# Patient Record
Sex: FEMALE | Race: WHITE | Employment: OTHER | URBAN - METROPOLITAN AREA
[De-identification: names, ages, dates, MRNs, and addresses within clinical notes are randomized per-mention and may not be internally consistent; named-entity substitution may affect disease eponyms.]

---

## 2017-12-07 ENCOUNTER — HOSPITAL ENCOUNTER (OUTPATIENT)
Dept: GENERAL RADIOLOGY | Age: 72
Discharge: HOME | End: 2017-12-07
Payer: MEDICARE

## 2017-12-07 DIAGNOSIS — J44.9 CHRONIC OBSTRUCTIVE PULMONARY DISEASE (HCC): ICD-10-CM

## 2017-12-07 PROCEDURE — 71020 XR CHEST PA LAT: CPT

## 2018-06-11 RX ORDER — BUDESONIDE AND FORMOTEROL FUMARATE DIHYDRATE 80; 4.5 UG/1; UG/1
2 AEROSOL RESPIRATORY (INHALATION) 2 TIMES DAILY
Refills: 3 | COMMUNITY
Start: 2018-04-30

## 2018-06-11 RX ORDER — LOSARTAN POTASSIUM 50 MG/1
50 TABLET ORAL DAILY
COMMUNITY

## 2018-06-11 RX ORDER — DIAZEPAM 5 MG/1
5 TABLET ORAL DAILY
COMMUNITY

## 2018-06-14 ENCOUNTER — ANESTHESIA EVENT (OUTPATIENT)
Dept: SURGERY | Age: 73
DRG: 492 | End: 2018-06-14
Payer: MEDICARE

## 2018-06-15 ENCOUNTER — ANESTHESIA (OUTPATIENT)
Dept: SURGERY | Age: 73
DRG: 492 | End: 2018-06-15
Payer: MEDICARE

## 2018-06-15 ENCOUNTER — APPOINTMENT (OUTPATIENT)
Dept: GENERAL RADIOLOGY | Age: 73
DRG: 492 | End: 2018-06-15
Attending: ORTHOPAEDIC SURGERY
Payer: MEDICARE

## 2018-06-15 ENCOUNTER — HOSPITAL ENCOUNTER (INPATIENT)
Age: 73
LOS: 3 days | Discharge: NURSING, REHAB OR LONG-TERM CARE | DRG: 492 | End: 2018-06-19
Attending: ORTHOPAEDIC SURGERY | Admitting: ORTHOPAEDIC SURGERY
Payer: MEDICARE

## 2018-06-15 DIAGNOSIS — S82.853B: Primary | ICD-10-CM

## 2018-06-15 PROBLEM — M19.079 ANKLE ARTHRITIS: Status: ACTIVE | Noted: 2018-06-15

## 2018-06-15 PROBLEM — G89.29 CHRONIC PAIN OF RIGHT ANKLE: Status: ACTIVE | Noted: 2018-06-15

## 2018-06-15 PROBLEM — M25.571 CHRONIC PAIN OF RIGHT ANKLE: Status: ACTIVE | Noted: 2018-06-15

## 2018-06-15 LAB
ANION GAP SERPL CALC-SCNC: 9 MMOL/L (ref 3–18)
BUN SERPL-MCNC: 19 MG/DL (ref 7–18)
BUN/CREAT SERPL: 17 (ref 12–20)
CALCIUM SERPL-MCNC: 8.6 MG/DL (ref 8.5–10.1)
CHLORIDE SERPL-SCNC: 101 MMOL/L (ref 100–108)
CO2 SERPL-SCNC: 28 MMOL/L (ref 21–32)
CREAT SERPL-MCNC: 1.11 MG/DL (ref 0.6–1.3)
GLUCOSE SERPL-MCNC: 113 MG/DL (ref 74–99)
POTASSIUM SERPL-SCNC: 3.6 MMOL/L (ref 3.5–5.5)
SODIUM SERPL-SCNC: 138 MMOL/L (ref 136–145)

## 2018-06-15 PROCEDURE — 94664 DEMO&/EVAL PT USE INHALER: CPT

## 2018-06-15 PROCEDURE — 76010000131 HC OR TIME 2 TO 2.5 HR: Performed by: ORTHOPAEDIC SURGERY

## 2018-06-15 PROCEDURE — 77030014685 HC STIM BN GRWTH PHYSIO EXTERNAL ORTH -I1: Performed by: ORTHOPAEDIC SURGERY

## 2018-06-15 PROCEDURE — 74011258636 HC RX REV CODE- 258/636: Performed by: ORTHOPAEDIC SURGERY

## 2018-06-15 PROCEDURE — 64450 NJX AA&/STRD OTHER PN/BRANCH: CPT | Performed by: ANESTHESIOLOGY

## 2018-06-15 PROCEDURE — 77030002916 HC SUT ETHLN J&J -A: Performed by: ORTHOPAEDIC SURGERY

## 2018-06-15 PROCEDURE — 74011250636 HC RX REV CODE- 250/636: Performed by: ORTHOPAEDIC SURGERY

## 2018-06-15 PROCEDURE — 76942 ECHO GUIDE FOR BIOPSY: CPT | Performed by: ANESTHESIOLOGY

## 2018-06-15 PROCEDURE — 74011250636 HC RX REV CODE- 250/636

## 2018-06-15 PROCEDURE — 73610 X-RAY EXAM OF ANKLE: CPT

## 2018-06-15 PROCEDURE — C1769 GUIDE WIRE: HCPCS | Performed by: ORTHOPAEDIC SURGERY

## 2018-06-15 PROCEDURE — 76210000016 HC OR PH I REC 1 TO 1.5 HR: Performed by: ORTHOPAEDIC SURGERY

## 2018-06-15 PROCEDURE — 94640 AIRWAY INHALATION TREATMENT: CPT

## 2018-06-15 PROCEDURE — 74011250636 HC RX REV CODE- 250/636: Performed by: NURSE ANESTHETIST, CERTIFIED REGISTERED

## 2018-06-15 PROCEDURE — C1713 ANCHOR/SCREW BN/BN,TIS/BN: HCPCS | Performed by: ORTHOPAEDIC SURGERY

## 2018-06-15 PROCEDURE — 74011250637 HC RX REV CODE- 250/637

## 2018-06-15 PROCEDURE — 74011250637 HC RX REV CODE- 250/637: Performed by: ORTHOPAEDIC SURGERY

## 2018-06-15 PROCEDURE — 99218 HC RM OBSERVATION: CPT

## 2018-06-15 PROCEDURE — 77030027694: Performed by: ORTHOPAEDIC SURGERY

## 2018-06-15 PROCEDURE — 77030020753 HC CUF TRNQT 1BLA STRY -B: Performed by: ORTHOPAEDIC SURGERY

## 2018-06-15 PROCEDURE — 77030012904 HC TAPE CST BSNM -A: Performed by: ORTHOPAEDIC SURGERY

## 2018-06-15 PROCEDURE — 77030013079 HC BLNKT BAIR HGGR 3M -A: Performed by: NURSE ANESTHETIST, CERTIFIED REGISTERED

## 2018-06-15 PROCEDURE — 77030027692: Performed by: ORTHOPAEDIC SURGERY

## 2018-06-15 PROCEDURE — 74011000250 HC RX REV CODE- 250: Performed by: HOSPITALIST

## 2018-06-15 PROCEDURE — 77030011640 HC PAD GRND REM COVD -A: Performed by: ORTHOPAEDIC SURGERY

## 2018-06-15 PROCEDURE — 77030020274 HC MISC IMPL ORTHOPEDIC: Performed by: ORTHOPAEDIC SURGERY

## 2018-06-15 PROCEDURE — 36415 COLL VENOUS BLD VENIPUNCTURE: CPT | Performed by: ORTHOPAEDIC SURGERY

## 2018-06-15 PROCEDURE — 80048 BASIC METABOLIC PNL TOTAL CA: CPT | Performed by: ORTHOPAEDIC SURGERY

## 2018-06-15 PROCEDURE — 77030018836 HC SOL IRR NACL ICUM -A: Performed by: ORTHOPAEDIC SURGERY

## 2018-06-15 PROCEDURE — 77030020335 HC PDNG CST COVD -A: Performed by: ORTHOPAEDIC SURGERY

## 2018-06-15 PROCEDURE — 74011000250 HC RX REV CODE- 250

## 2018-06-15 PROCEDURE — 76060000035 HC ANESTHESIA 2 TO 2.5 HR: Performed by: ORTHOPAEDIC SURGERY

## 2018-06-15 PROCEDURE — 77030032490 HC SLV COMPR SCD KNE COVD -B: Performed by: ORTHOPAEDIC SURGERY

## 2018-06-15 PROCEDURE — 77030012510 HC MSK AIRWY LMA TELE -B: Performed by: NURSE ANESTHETIST, CERTIFIED REGISTERED

## 2018-06-15 PROCEDURE — 77010033678 HC OXYGEN DAILY

## 2018-06-15 PROCEDURE — 74011250637 HC RX REV CODE- 250/637: Performed by: HOSPITALIST

## 2018-06-15 DEVICE — IMPLANTABLE DEVICE: Type: IMPLANTABLE DEVICE | Site: TIBIA | Status: FUNCTIONAL

## 2018-06-15 DEVICE — GRAFT BNE SUB L CANC FRZN MORSELIZED W/ VIABLE CELL TRINITY: Type: IMPLANTABLE DEVICE | Site: TIBIA | Status: FUNCTIONAL

## 2018-06-15 DEVICE — CAP END 0MM CANN LOK FOR CENTRONAIL ANK COMPR NAILING SYS: Type: IMPLANTABLE DEVICE | Site: TIBIA | Status: FUNCTIONAL

## 2018-06-15 RX ORDER — SODIUM CHLORIDE, SODIUM LACTATE, POTASSIUM CHLORIDE, CALCIUM CHLORIDE 600; 310; 30; 20 MG/100ML; MG/100ML; MG/100ML; MG/100ML
75 INJECTION, SOLUTION INTRAVENOUS CONTINUOUS
Status: DISCONTINUED | OUTPATIENT
Start: 2018-06-15 | End: 2018-06-15 | Stop reason: HOSPADM

## 2018-06-15 RX ORDER — PROPOFOL 10 MG/ML
INJECTION, EMULSION INTRAVENOUS AS NEEDED
Status: DISCONTINUED | OUTPATIENT
Start: 2018-06-15 | End: 2018-06-15 | Stop reason: HOSPADM

## 2018-06-15 RX ORDER — INSULIN LISPRO 100 [IU]/ML
INJECTION, SOLUTION INTRAVENOUS; SUBCUTANEOUS ONCE
Status: DISCONTINUED | OUTPATIENT
Start: 2018-06-15 | End: 2018-06-15 | Stop reason: HOSPADM

## 2018-06-15 RX ORDER — DEXTROSE, SODIUM CHLORIDE, SODIUM LACTATE, POTASSIUM CHLORIDE, AND CALCIUM CHLORIDE 5; .6; .31; .03; .02 G/100ML; G/100ML; G/100ML; G/100ML; G/100ML
INJECTION, SOLUTION INTRAVENOUS AS NEEDED
Status: DISCONTINUED | OUTPATIENT
Start: 2018-06-15 | End: 2018-06-15 | Stop reason: HOSPADM

## 2018-06-15 RX ORDER — ROSUVASTATIN CALCIUM 10 MG/1
10 TABLET, COATED ORAL
Status: DISCONTINUED | OUTPATIENT
Start: 2018-06-15 | End: 2018-06-19 | Stop reason: HOSPADM

## 2018-06-15 RX ORDER — FENTANYL CITRATE 50 UG/ML
INJECTION, SOLUTION INTRAMUSCULAR; INTRAVENOUS
Status: COMPLETED
Start: 2018-06-15 | End: 2018-06-15

## 2018-06-15 RX ORDER — ONDANSETRON 2 MG/ML
4 INJECTION INTRAMUSCULAR; INTRAVENOUS
Status: DISCONTINUED | OUTPATIENT
Start: 2018-06-15 | End: 2018-06-19 | Stop reason: HOSPADM

## 2018-06-15 RX ORDER — TRAMADOL HYDROCHLORIDE 50 MG/1
50 TABLET ORAL EVERY 6 HOURS
Status: DISCONTINUED | OUTPATIENT
Start: 2018-06-15 | End: 2018-06-19 | Stop reason: HOSPADM

## 2018-06-15 RX ORDER — MAGNESIUM SULFATE 100 %
4 CRYSTALS MISCELLANEOUS AS NEEDED
Status: DISCONTINUED | OUTPATIENT
Start: 2018-06-15 | End: 2018-06-15 | Stop reason: HOSPADM

## 2018-06-15 RX ORDER — NALOXONE HYDROCHLORIDE 0.4 MG/ML
0.1 INJECTION, SOLUTION INTRAMUSCULAR; INTRAVENOUS; SUBCUTANEOUS AS NEEDED
Status: DISCONTINUED | OUTPATIENT
Start: 2018-06-15 | End: 2018-06-15 | Stop reason: HOSPADM

## 2018-06-15 RX ORDER — HYDROMORPHONE HYDROCHLORIDE 1 MG/ML
INJECTION, SOLUTION INTRAMUSCULAR; INTRAVENOUS; SUBCUTANEOUS
Status: COMPLETED
Start: 2018-06-15 | End: 2018-06-15

## 2018-06-15 RX ORDER — ONDANSETRON 2 MG/ML
INJECTION INTRAMUSCULAR; INTRAVENOUS AS NEEDED
Status: DISCONTINUED | OUTPATIENT
Start: 2018-06-15 | End: 2018-06-15 | Stop reason: HOSPADM

## 2018-06-15 RX ORDER — BUDESONIDE AND FORMOTEROL FUMARATE DIHYDRATE 80; 4.5 UG/1; UG/1
2 AEROSOL RESPIRATORY (INHALATION) 2 TIMES DAILY
Status: DISCONTINUED | OUTPATIENT
Start: 2018-06-15 | End: 2018-06-15 | Stop reason: CLARIF

## 2018-06-15 RX ORDER — HYDROMORPHONE HYDROCHLORIDE 2 MG/ML
0.5 INJECTION, SOLUTION INTRAMUSCULAR; INTRAVENOUS; SUBCUTANEOUS
Status: DISCONTINUED | OUTPATIENT
Start: 2018-06-15 | End: 2018-06-15 | Stop reason: HOSPADM

## 2018-06-15 RX ORDER — HYDROMORPHONE HYDROCHLORIDE 1 MG/ML
0.5 INJECTION, SOLUTION INTRAMUSCULAR; INTRAVENOUS; SUBCUTANEOUS
Status: DISCONTINUED | OUTPATIENT
Start: 2018-06-15 | End: 2018-06-17

## 2018-06-15 RX ORDER — ARFORMOTEROL TARTRATE 15 UG/2ML
15 SOLUTION RESPIRATORY (INHALATION)
Status: DISCONTINUED | OUTPATIENT
Start: 2018-06-15 | End: 2018-06-19 | Stop reason: HOSPADM

## 2018-06-15 RX ORDER — FENTANYL CITRATE 50 UG/ML
INJECTION, SOLUTION INTRAMUSCULAR; INTRAVENOUS AS NEEDED
Status: DISCONTINUED | OUTPATIENT
Start: 2018-06-15 | End: 2018-06-15 | Stop reason: HOSPADM

## 2018-06-15 RX ORDER — SODIUM CHLORIDE 0.9 % (FLUSH) 0.9 %
5-10 SYRINGE (ML) INJECTION EVERY 8 HOURS
Status: DISCONTINUED | OUTPATIENT
Start: 2018-06-15 | End: 2018-06-19 | Stop reason: HOSPADM

## 2018-06-15 RX ORDER — MIDAZOLAM HYDROCHLORIDE 1 MG/ML
INJECTION, SOLUTION INTRAMUSCULAR; INTRAVENOUS
Status: COMPLETED
Start: 2018-06-15 | End: 2018-06-15

## 2018-06-15 RX ORDER — HYDROMORPHONE HYDROCHLORIDE 2 MG/1
2-4 TABLET ORAL
Status: DISCONTINUED | OUTPATIENT
Start: 2018-06-15 | End: 2018-06-17

## 2018-06-15 RX ORDER — MIDAZOLAM HYDROCHLORIDE 1 MG/ML
2 INJECTION, SOLUTION INTRAMUSCULAR; INTRAVENOUS ONCE
Status: COMPLETED | OUTPATIENT
Start: 2018-06-15 | End: 2018-06-15

## 2018-06-15 RX ORDER — SODIUM CHLORIDE 0.9 % (FLUSH) 0.9 %
5-10 SYRINGE (ML) INJECTION EVERY 8 HOURS
Status: DISCONTINUED | OUTPATIENT
Start: 2018-06-15 | End: 2018-06-15 | Stop reason: HOSPADM

## 2018-06-15 RX ORDER — CEFAZOLIN SODIUM 2 G/50ML
2 SOLUTION INTRAVENOUS
Status: COMPLETED | OUTPATIENT
Start: 2018-06-15 | End: 2018-06-15

## 2018-06-15 RX ORDER — SODIUM CHLORIDE 0.9 % (FLUSH) 0.9 %
5-10 SYRINGE (ML) INJECTION AS NEEDED
Status: DISCONTINUED | OUTPATIENT
Start: 2018-06-15 | End: 2018-06-19 | Stop reason: HOSPADM

## 2018-06-15 RX ORDER — ENOXAPARIN SODIUM 100 MG/ML
40 INJECTION SUBCUTANEOUS EVERY 24 HOURS
Status: DISCONTINUED | OUTPATIENT
Start: 2018-06-16 | End: 2018-06-19 | Stop reason: HOSPADM

## 2018-06-15 RX ORDER — FAMOTIDINE 20 MG/1
TABLET, FILM COATED ORAL
Status: COMPLETED
Start: 2018-06-15 | End: 2018-06-15

## 2018-06-15 RX ORDER — ONDANSETRON 2 MG/ML
4 INJECTION INTRAMUSCULAR; INTRAVENOUS ONCE
Status: DISCONTINUED | OUTPATIENT
Start: 2018-06-15 | End: 2018-06-15 | Stop reason: HOSPADM

## 2018-06-15 RX ORDER — FLUMAZENIL 0.1 MG/ML
0.2 INJECTION INTRAVENOUS
Status: DISCONTINUED | OUTPATIENT
Start: 2018-06-15 | End: 2018-06-15 | Stop reason: HOSPADM

## 2018-06-15 RX ORDER — SODIUM CHLORIDE, SODIUM LACTATE, POTASSIUM CHLORIDE, CALCIUM CHLORIDE 600; 310; 30; 20 MG/100ML; MG/100ML; MG/100ML; MG/100ML
50 INJECTION, SOLUTION INTRAVENOUS CONTINUOUS
Status: DISCONTINUED | OUTPATIENT
Start: 2018-06-16 | End: 2018-06-15 | Stop reason: HOSPADM

## 2018-06-15 RX ORDER — SODIUM CHLORIDE 0.9 % (FLUSH) 0.9 %
5-10 SYRINGE (ML) INJECTION AS NEEDED
Status: DISCONTINUED | OUTPATIENT
Start: 2018-06-15 | End: 2018-06-15 | Stop reason: HOSPADM

## 2018-06-15 RX ORDER — DEXTROSE MONOHYDRATE 25 G/50ML
25-50 INJECTION, SOLUTION INTRAVENOUS AS NEEDED
Status: DISCONTINUED | OUTPATIENT
Start: 2018-06-15 | End: 2018-06-15 | Stop reason: HOSPADM

## 2018-06-15 RX ORDER — FAMOTIDINE 20 MG/1
20 TABLET, FILM COATED ORAL ONCE
Status: COMPLETED | OUTPATIENT
Start: 2018-06-16 | End: 2018-06-15

## 2018-06-15 RX ORDER — LIDOCAINE HYDROCHLORIDE 20 MG/ML
INJECTION, SOLUTION EPIDURAL; INFILTRATION; INTRACAUDAL; PERINEURAL AS NEEDED
Status: DISCONTINUED | OUTPATIENT
Start: 2018-06-15 | End: 2018-06-15 | Stop reason: HOSPADM

## 2018-06-15 RX ORDER — ALBUTEROL SULFATE 0.83 MG/ML
2.5 SOLUTION RESPIRATORY (INHALATION)
Status: DISCONTINUED | OUTPATIENT
Start: 2018-06-15 | End: 2018-06-19 | Stop reason: HOSPADM

## 2018-06-15 RX ORDER — FENTANYL CITRATE 50 UG/ML
100 INJECTION, SOLUTION INTRAMUSCULAR; INTRAVENOUS ONCE
Status: COMPLETED | OUTPATIENT
Start: 2018-06-15 | End: 2018-06-15

## 2018-06-15 RX ORDER — BUDESONIDE 0.5 MG/2ML
500 INHALANT ORAL
Status: DISCONTINUED | OUTPATIENT
Start: 2018-06-15 | End: 2018-06-19 | Stop reason: HOSPADM

## 2018-06-15 RX ADMIN — FENTANYL CITRATE 50 MCG: 50 INJECTION, SOLUTION INTRAMUSCULAR; INTRAVENOUS at 13:37

## 2018-06-15 RX ADMIN — FAMOTIDINE 20 MG: 20 TABLET ORAL at 11:06

## 2018-06-15 RX ADMIN — CEFAZOLIN SODIUM 2 G: 2 SOLUTION INTRAVENOUS at 13:54

## 2018-06-15 RX ADMIN — ARFORMOTEROL TARTRATE 15 MCG: 15 SOLUTION RESPIRATORY (INHALATION) at 21:24

## 2018-06-15 RX ADMIN — ONDANSETRON 4 MG: 2 INJECTION INTRAMUSCULAR; INTRAVENOUS at 14:07

## 2018-06-15 RX ADMIN — LIDOCAINE HYDROCHLORIDE 40 MG: 20 INJECTION, SOLUTION EPIDURAL; INFILTRATION; INTRACAUDAL; PERINEURAL at 14:01

## 2018-06-15 RX ADMIN — MIDAZOLAM HYDROCHLORIDE 1 MG: 1 INJECTION, SOLUTION INTRAMUSCULAR; INTRAVENOUS at 13:36

## 2018-06-15 RX ADMIN — FAMOTIDINE 20 MG: 20 TABLET, FILM COATED ORAL at 11:06

## 2018-06-15 RX ADMIN — HYDROMORPHONE HYDROCHLORIDE 0.5 MG: 1 INJECTION, SOLUTION INTRAMUSCULAR; INTRAVENOUS; SUBCUTANEOUS at 16:35

## 2018-06-15 RX ADMIN — Medication 10 ML: at 21:28

## 2018-06-15 RX ADMIN — HYDROMORPHONE HYDROCHLORIDE 2 MG: 2 TABLET ORAL at 22:32

## 2018-06-15 RX ADMIN — SODIUM CHLORIDE, SODIUM LACTATE, POTASSIUM CHLORIDE, AND CALCIUM CHLORIDE 50 ML/HR: 600; 310; 30; 20 INJECTION, SOLUTION INTRAVENOUS at 11:05

## 2018-06-15 RX ADMIN — ROSUVASTATIN CALCIUM 10 MG: 10 TABLET, FILM COATED ORAL at 21:28

## 2018-06-15 RX ADMIN — FENTANYL CITRATE 25 MCG: 50 INJECTION, SOLUTION INTRAMUSCULAR; INTRAVENOUS at 15:06

## 2018-06-15 RX ADMIN — TRAMADOL HYDROCHLORIDE 50 MG: 50 TABLET, FILM COATED ORAL at 20:16

## 2018-06-15 RX ADMIN — FENTANYL CITRATE 25 MCG: 50 INJECTION, SOLUTION INTRAMUSCULAR; INTRAVENOUS at 15:35

## 2018-06-15 RX ADMIN — PROPOFOL 120 MG: 10 INJECTION, EMULSION INTRAVENOUS at 14:01

## 2018-06-15 RX ADMIN — FENTANYL CITRATE 25 MCG: 50 INJECTION, SOLUTION INTRAMUSCULAR; INTRAVENOUS at 14:28

## 2018-06-15 RX ADMIN — FENTANYL CITRATE 25 MCG: 50 INJECTION, SOLUTION INTRAMUSCULAR; INTRAVENOUS at 14:24

## 2018-06-15 RX ADMIN — BUDESONIDE 500 MCG: 0.5 INHALANT RESPIRATORY (INHALATION) at 21:24

## 2018-06-15 NOTE — ANESTHESIA POSTPROCEDURE EVALUATION
Post-Anesthesia Evaluation & Assessment    Visit Vitals    /78    Pulse 78    Temp 36.3 °C (97.3 °F)    Resp 16    Ht 5' 4\" (1.626 m)    Wt 66.2 kg (146 lb)    SpO2 97%    BMI 25.06 kg/m2       Nausea/Vomiting: no nausea and no vomiting    Pain score (VAS): 0    Post-operative hydration adequate.     Mental status & Level of consciousness: orientation per pre-anesthetic level    Neurological status: moves all extremities, sensation grossly intact    Pulmonary status: airway patent, no supplemental oxygen required    Complications related to anesthesia: none    Additional comments:        Nya Gonzalez CRNA  Mary Beth 15, 2018

## 2018-06-15 NOTE — ANESTHESIA POSTPROCEDURE EVALUATION
Post-Anesthesia Evaluation and Assessment    Patient: Niko Regan MRN: [id number]  SSN: [id number]    YOB: 1945  Age: 67 y.o. Sex: female       Cardiovascular Function/Vital Signs  Visit Vitals    /78    Pulse 78    Temp 36.3 °C (97.3 °F)    Resp 16    Ht 5' 4\" (1.626 m)    Wt 66.2 kg (146 lb)    SpO2 97%    BMI 25.06 kg/m2       Patient is status post general, regional anesthesia for Procedure(s):  right tibiotalocalcaneal fusion with pop/sap. Nausea/Vomiting: None    Postoperative hydration reviewed and adequate. Pain:  Pain Scale 1: Numeric (0 - 10) (06/15/18 1054)  Pain Intensity 1: 0 (06/15/18 1054)   Managed    Neurological Status:   Neuro (WDL): Within Defined Limits (06/15/18 1059)   At baseline    Mental Status and Level of Consciousness: Arousable    Pulmonary Status:   O2 Device: Nasal cannula (06/15/18 1609)   Adequate oxygenation and airway patent    Complications related to anesthesia: None    Post-anesthesia assessment completed.  No concerns    Signed By: Domingo Winchester MD     Mary Beth 15, 2018

## 2018-06-15 NOTE — PERIOP NOTES
Patient recieved from OR no S/S of distress noted report received from [address] [place] [address]

## 2018-06-15 NOTE — ANESTHESIA PREPROCEDURE EVALUATION
Anesthetic History               Review of Systems / Medical History  Patient summary reviewed, nursing notes reviewed and pertinent labs reviewed    Pulmonary    COPD: moderate               Neuro/Psych       CVA: no residual symptoms  TIA     Cardiovascular    Hypertension: well controlled                   GI/Hepatic/Renal  Within defined limits              Endo/Other  Within defined limits           Other Findings   Comments: Documentation of current medication  Current medications obtained, documented and obtained? YES      Risk Factors for Postoperative nausea/vomiting:       History of postoperative nausea/vomiting? NO       Female? YES       Motion sickness? NO       Intended opioid administration for postoperative analgesia? YES      Smoking Abstinence:  Current Smoker? NO  Elective Surgery? YES  Seen preoperatively by anesthesiologist or proxy prior to day of surgery? YES  Pt abstained from smoking 24 hours prior to anesthesia?  N/A    Preventive care/screening for High Blood Pressure:  Aged 18 years and older: YES  Screened for high blood pressure: YES  Patients with high blood pressure referred to primary care provider   for BP management: YES               Physical Exam    Airway  Mallampati: III  TM Distance: 4 - 6 cm  Neck ROM: normal range of motion   Mouth opening: Normal     Cardiovascular  Regular rate and rhythm,  S1 and S2 normal,  no murmur, click, rub, or gallop  Rhythm: regular  Rate: normal         Dental  No notable dental hx       Pulmonary  Breath sounds clear to auscultation               Abdominal  GI exam deferred       Other Findings            Anesthetic Plan    ASA: 3  Anesthesia type: general and regional - popliteal fossa block and saphenous block          Induction: Intravenous  Anesthetic plan and risks discussed with: Patient

## 2018-06-15 NOTE — H&P
H&P Update:  Annel Kerr was seen and examined. History and physical has been reviewed. The patient has been examined. There have been no significant clinical changes since the completion of the originally dated History and Physical.  H&P performed by Dr. Fco Harmon on 6/7/18 - scanned into Epic.     Signed By: Louann Hernández MD     Mary Beth 15, 2018 1:01 PM

## 2018-06-15 NOTE — PERIOP NOTES
Patient transferred to [facility] license of [facility] [phone] in good condition via gurney accompanied by  report given to [place] rn

## 2018-06-15 NOTE — ANESTHESIA PROCEDURE NOTES
Peripheral Block    Start time: 6/15/2018 1:27 PM  End time: 6/15/2018 1:37 PM  Performed by: Enrrique Donis  Authorized by: Enrrique Donis       Pre-procedure:    Indications: at surgeon's request and post-op pain management    Preanesthetic Checklist: patient identified, risks and benefits discussed, site marked, timeout performed, anesthesia consent given and patient being monitored    Timeout Time: 13:27          Block Type:   Block Type:  Popliteal and saphenous  Laterality:  Right  Monitoring:  Standard ASA monitoring, continuous pulse ox, frequent vital sign checks, oxygen, responsive to questions and heart rate  Injection Technique:  Single shot  Procedures: ultrasound guided    Patient Position: left lateral decubitus  Prep: chlorhexidine    Location:  Upper thigh  Needle Type:  Ultraplex  Needle Gauge:  21 G  Needle Localization:  Ultrasound guidance  Medication Injected:  0.5%  ropivacaine  Volume (mL):  28  Add'l Medication Injected:  0.5%  ropivacaine  Volume (mL):  7    Assessment:  Number of attempts:  1  Injection Assessment:  Incremental injection every 5 mL, no paresthesia, ultrasound image on chart, no intravascular symptoms, negative aspiration for blood and local visualized surrounding nerve on ultrasound  Patient tolerance:  Patient tolerated the procedure well with no immediate complications  Signed by Dr. Brenna Naqvi

## 2018-06-15 NOTE — BRIEF OP NOTE
BRIEF OPERATIVE NOTE    Date of Procedure: 6/15/2018   Preoperative Diagnosis: m19.071  Postoperative Diagnosis: m19.071    Procedure(s):  right tibiotalocalcaneal fusion with pop/sap  Surgeon(s) and Role:     * Kory Yanez MD - Primary         Surgical Assistant: Renetta Fonseca    Surgical Staff:  Circ-1: Yehuda Alvares  Scrub Tech-Relief: Cleave Coffee  Surg Asst-1: Irasema Lambert  Surg Asst-Relief: Mode Greco  Event Time In   Incision Start 1422   Incision Close      Anesthesia: General   Estimated Blood Loss: 30 mL  Specimens: * No specimens in log *   Findings: good correction   Complications: none  Implants:   Implant Name Type Inv. Item Serial No.  Lot No. LRB No. Used Action   Titanium ankle hindfoot nail 10/250    [employer]  Right 1 Implanted   Titanium Screw 5 mm 20 mm    [employer] [id number] Right 1 Implanted           [id number] orthofix TTC.    Awilda elite

## 2018-06-15 NOTE — CONSULTS
Internal Medicine Consult          Consult Requested By: Dr. Tanner Robb, orthopedic surgery    Subjective     HPI: Seb Ho is a 67 y.o. female with a PMHx of COPD, HTN, HLD who we were consulted for medical management while undergoing right tibiotalocalcaneal fusion. The patient was evaluated in pre-op holding area. She is without any complaints today other than the months old pain she's been dealing with in the right ankle. She initially broke the ankle 2 years ago. She is on COPD inhalers and wants to make sure she gets those as inpatient. No other issues prior to surgery. We were asked to follow the patient. PMHx:  Past Medical History:   Diagnosis Date    Arm fracture, left     Chronic diarrhea     [facility])     Hypercholesterolemia     Hypertension     Mixed urge and stress incontinence     Proteinuria     Right ankle injury     Stroke [facility])        PSurgHx:  Past Surgical History:   Procedure Laterality Date    HX HYSTERECTOMY      HX ORTHOPAEDIC  2014    ARM AND ELBOW SX. Left bam    HX ORTHOPAEDIC      rt.elbow        SocialHx:  Social History     Social History    Marital status:      Spouse name: N/A    Number of children: N/A    Years of education: N/A     Occupational History    Not on file. Social History Main Topics    Smoking status: Former Smoker     Packs/day: 1.00     Years: 30.00     Quit date: 2/9/2008    Smokeless tobacco: Never Used    Alcohol use Yes      Comment: a little    Drug use: No    Sexual activity: Not on file     Other Topics Concern    Not on file     Social History Narrative       FamilyHx:  History reviewed. No pertinent family history. Prior to Admission Medications   Prescriptions Last Dose Informant Patient Reported? Taking? SYMBICORT 80-4.5 mcg/actuation HFAA 6/15/2018 at Unknown time  Yes Yes   Sig: Take 2 Puffs by inhalation two (2) times a day.    acetaminophen (TYLENOL) 650 mg CR tablet   No Yes   Sig: Take 1 Tab by mouth every eight (8) hours. aspirin 81 mg chewable tablet 6/8/2018  No Yes   Sig: Take 1 Tab by mouth daily. calcium-cholecalciferol, d3, (CALCIUM 600 + D) 600-125 mg-unit tab 6/14/2018 at Unknown time  Yes Yes   Sig: Take 0.5 Tabs by mouth daily. conjugated estrogens (PREMARIN) 0.9 mg tablet   No Yes   Sig: Take 1 Tab by mouth daily. Patient taking differently: Take 0.9 mg by mouth nightly. diazePAM (VALIUM) 5 mg tablet Not Taking at Unknown time  Yes No   Sig: Take 5 mg by mouth daily. diphenoxylate-atropine (LOMOTIL) 2.5-0.025 mg per tablet 6/8/2018 at Unknown time  Yes Yes   Sig: Take  by mouth four (4) times daily as needed. ergocalciferol (ERGOCALCIFEROL) 50,000 unit capsule Not Taking at Unknown time  No No   Sig: Take 1 Cap by mouth every seven (7) days. losartan (COZAAR) 50 mg tablet 6/14/2018 at Unknown time  Yes Yes   Sig: Take 50 mg by mouth daily. rosuvastatin (CRESTOR) 10 mg tablet 6/14/2018 at Unknown time  No Yes   Sig: Take 1 Tab by mouth daily. Patient taking differently: Take 10 mg by mouth nightly. senna (SENOKOT) 8.6 mg tablet   No No   Sig: Take 1 Tab by mouth two (2) times a day. Hold for loose Bowel Movement   triamterene-hydrochlorothiazide (MAXZIDE) 37.5-25 mg per tablet Unknown at Unknown time  No No   Sig: Take 0.5 Tabs by mouth daily. Patient taking differently: Take 0.5 Tabs by mouth nightly.       Facility-Administered Medications: None       Review of Systems:  CONST: Fever, weight loss, fatigue or chills  HEENT: Recent changes in vision, vertigo, epistaxis, dysphagia and hoarseness  CV: Chest pain, palpitations, HTN, edema and varicosities  RESP: Cough, shortness of breath, wheezing, hemoptysis, snoring and reactive airway disease  GI: Nausea, vomiting, abdominal pain, change in bowel habits, hematochezia, melena, and GERD   : Hematuria, dysuria, frequency, urgency, nocturia and stress urinary incontinence   MS: Weakness, joint pain and arthritis  ENDO: Diabetes, thyroid disease, polyuria, polydipsia, polyphagia, poor wound healing, heat intolerance, cold intolerance  LYMPH/HEME: Anemia, bruising and history of blood transfusions  INTEG: Dermatitis, abnormal moles  NEURO: Dizziness, headache, fainting, seizures and stroke  PSYCH: Anxiety and depression        Objective      Visit Vitals    /59 (BP 1 Location: Right arm, BP Patient Position: At rest;Supine)    Pulse (!) 59    Temp 98 °F (36.7 °C)    Resp 20    Ht 5' 4\" (1.626 m)    Wt 66.2 kg (146 lb)    SpO2 92%    BMI 25.06 kg/m2       Physical Exam:  General Appearance: NAD, conversant  HENT: normocephalic/atraumatic, moist mucus membranes  Lungs: CTA with normal respiratory effort  CV: RRR, no m/r/g  Abdomen: soft, non-tender, normal bowel sounds  Extremities: no cyanosis, no peripheral edema  Neuro: moves all extremities, no focal deficits  Psych: appropriate affect, alert and oriented to person, place and time    Laboratory Studies: All lab results for the last 24 hours reviewed. Imaging Reviewed:  No results found. Assessment/Plan     Active Hospital Problems    Diagnosis Date Noted    Chronic pain of right ankle 06/15/2018    COPD (chronic obstructive pulmonary disease) ([facility] [address] 04/04/2016    Hypertension 04/04/2016    Hypercholesterolemia 04/04/2016     - Diet and mobilization per primary team  - Pain control PRN  - PT/OT  - Would start BP meds in AM  - Duonebs PRN in house  - Cont acceptable home medications for chronic conditions   - DVT protocol    I reviewed all available labs and imaging that were available prior to my encounter. We appreciate the consultation for medical management and appreciate being able to be involved with their care during hospitalization.       Silvana Rodriguez DO  Internal Medicine, Hospitalist  Pager: [phone] [facility]

## 2018-06-15 NOTE — PERIOP NOTES
TRANSFER - OUT REPORT:    Verbal report given to toribio mahmood(name) on Kelsey Florez  being transferred to 2223(unit) for routine post - op       Report consisted of patients Situation, Background, Assessment and   Recommendations(SBAR). Information from the following report(s) SBAR, OR Summary, Intake/Output and MAR was reviewed with the receiving nurse. Lines:   Peripheral IV 06/15/18 Left Forearm (Active)   Site Assessment Clean 6/15/2018  4:30 PM   Phlebitis Assessment 0 6/15/2018  4:30 PM   Infiltration Assessment 0 6/15/2018  4:30 PM   Dressing Status Clean, dry, & intact 6/15/2018  4:30 PM   Dressing Type Transparent;Tape 6/15/2018  4:30 PM   Hub Color/Line Status Patent; Infusing;Pink 6/15/2018  4:30 PM   Alcohol Cap Used Yes 6/15/2018 11:00 AM        Opportunity for questions and clarification was provided.       Patient transported with:   quitchen

## 2018-06-15 NOTE — PROGRESS NOTES
1830 pt arrived on unit, pt denies pain and in no acute distress, pt eating dinner, pt spouse at bedside, will monitor,, pfreeman rn

## 2018-06-15 NOTE — PROGRESS NOTES
TRANSFER - IN REPORT:    Verbal report received from [address] [facility] on [address]  being received from PACU for routine post - op      Report consisted of patients Situation, Background, Assessment and   Recommendations(SBAR). Information from the following report(s) SBAR and Procedure Summary was reviewed with the receiving nurse. Opportunity for questions and clarification was provided. 1800 Received pt via stretcher awake and alert in NAD. RLE dressing d/i. Oriented to call bell, phone and IS with pt giving very poor demonstration of use. Needs reinforcement.  at [place].

## 2018-06-16 LAB
ANION GAP SERPL CALC-SCNC: 8 MMOL/L (ref 3–18)
BASOPHILS # BLD: 0 K/UL (ref 0–0.06)
BASOPHILS NFR BLD: 0 % (ref 0–2)
BUN SERPL-MCNC: 16 MG/DL (ref 7–18)
BUN/CREAT SERPL: 14 (ref 12–20)
CALCIUM SERPL-MCNC: 8.5 MG/DL (ref 8.5–10.1)
CHLORIDE SERPL-SCNC: 98 MMOL/L (ref 100–108)
CO2 SERPL-SCNC: 31 MMOL/L (ref 21–32)
CREAT SERPL-MCNC: 1.12 MG/DL (ref 0.6–1.3)
DIFFERENTIAL METHOD BLD: ABNORMAL
EOSINOPHIL # BLD: 0.1 K/UL (ref 0–0.4)
EOSINOPHIL NFR BLD: 1 % (ref 0–5)
ERYTHROCYTE [DISTWIDTH] IN BLOOD BY AUTOMATED COUNT: 12.2 % (ref 11.6–14.5)
GLUCOSE SERPL-MCNC: 132 MG/DL (ref 74–99)
HCT VFR BLD AUTO: 33.6 % (ref 35–45)
HGB BLD-MCNC: 10.8 G/DL (ref 12–16)
LYMPHOCYTES # BLD: 0.7 K/UL (ref 0.9–3.6)
LYMPHOCYTES NFR BLD: 6 % (ref 21–52)
MCH RBC QN AUTO: 31.8 PG (ref 24–34)
MCHC RBC AUTO-ENTMCNC: 32.1 G/DL (ref 31–37)
MCV RBC AUTO: 98.8 FL (ref 74–97)
MONOCYTES # BLD: 1.1 K/UL (ref 0.05–1.2)
MONOCYTES NFR BLD: 8 % (ref 3–10)
NEUTS SEG # BLD: 11 K/UL (ref 1.8–8)
NEUTS SEG NFR BLD: 85 % (ref 40–73)
PLATELET # BLD AUTO: 196 K/UL (ref 135–420)
PMV BLD AUTO: 12.2 FL (ref 9.2–11.8)
POTASSIUM SERPL-SCNC: 3.8 MMOL/L (ref 3.5–5.5)
RBC # BLD AUTO: 3.4 M/UL (ref 4.2–5.3)
SODIUM SERPL-SCNC: 137 MMOL/L (ref 136–145)
WBC # BLD AUTO: 12.9 K/UL (ref 4.6–13.2)

## 2018-06-16 PROCEDURE — 74011250637 HC RX REV CODE- 250/637: Performed by: ORTHOPAEDIC SURGERY

## 2018-06-16 PROCEDURE — 97116 GAIT TRAINING THERAPY: CPT

## 2018-06-16 PROCEDURE — 97162 PT EVAL MOD COMPLEX 30 MIN: CPT

## 2018-06-16 PROCEDURE — 80048 BASIC METABOLIC PNL TOTAL CA: CPT | Performed by: ORTHOPAEDIC SURGERY

## 2018-06-16 PROCEDURE — 36415 COLL VENOUS BLD VENIPUNCTURE: CPT | Performed by: ORTHOPAEDIC SURGERY

## 2018-06-16 PROCEDURE — 74011000250 HC RX REV CODE- 250: Performed by: HOSPITALIST

## 2018-06-16 PROCEDURE — 77030012893

## 2018-06-16 PROCEDURE — 94640 AIRWAY INHALATION TREATMENT: CPT

## 2018-06-16 PROCEDURE — 74011250637 HC RX REV CODE- 250/637: Performed by: HOSPITALIST

## 2018-06-16 PROCEDURE — 0SGF0KZ FUSION OF RIGHT ANKLE JOINT WITH NONAUTOLOGOUS TISSUE SUBSTITUTE, OPEN APPROACH: ICD-10-PCS | Performed by: ORTHOPAEDIC SURGERY

## 2018-06-16 PROCEDURE — 99218 HC RM OBSERVATION: CPT

## 2018-06-16 PROCEDURE — 85025 COMPLETE CBC W/AUTO DIFF WBC: CPT | Performed by: ORTHOPAEDIC SURGERY

## 2018-06-16 PROCEDURE — 65270000029 HC RM PRIVATE

## 2018-06-16 PROCEDURE — 74011250636 HC RX REV CODE- 250/636: Performed by: ORTHOPAEDIC SURGERY

## 2018-06-16 PROCEDURE — 3E0T3BZ INTRODUCTION OF ANESTHETIC AGENT INTO PERIPHERAL NERVES AND PLEXI, PERCUTANEOUS APPROACH: ICD-10-PCS | Performed by: ANESTHESIOLOGY

## 2018-06-16 PROCEDURE — 0SGF04Z FUSION OF RIGHT ANKLE JOINT WITH INTERNAL FIXATION DEVICE, OPEN APPROACH: ICD-10-PCS | Performed by: ORTHOPAEDIC SURGERY

## 2018-06-16 RX ORDER — ACETAMINOPHEN 500 MG
500 TABLET ORAL EVERY 6 HOURS
Status: DISCONTINUED | OUTPATIENT
Start: 2018-06-16 | End: 2018-06-19 | Stop reason: HOSPADM

## 2018-06-16 RX ADMIN — TRAMADOL HYDROCHLORIDE 50 MG: 50 TABLET, FILM COATED ORAL at 05:53

## 2018-06-16 RX ADMIN — TRAMADOL HYDROCHLORIDE 50 MG: 50 TABLET, FILM COATED ORAL at 12:54

## 2018-06-16 RX ADMIN — ACETAMINOPHEN 500 MG: 500 TABLET, FILM COATED ORAL at 12:54

## 2018-06-16 RX ADMIN — ENOXAPARIN SODIUM 40 MG: 100 INJECTION SUBCUTANEOUS at 07:56

## 2018-06-16 RX ADMIN — TRAMADOL HYDROCHLORIDE 50 MG: 50 TABLET, FILM COATED ORAL at 00:30

## 2018-06-16 RX ADMIN — BUDESONIDE 500 MCG: 0.5 INHALANT RESPIRATORY (INHALATION) at 09:44

## 2018-06-16 RX ADMIN — ARFORMOTEROL TARTRATE 15 MCG: 15 SOLUTION RESPIRATORY (INHALATION) at 09:44

## 2018-06-16 RX ADMIN — ROSUVASTATIN CALCIUM 10 MG: 10 TABLET, FILM COATED ORAL at 21:51

## 2018-06-16 RX ADMIN — Medication 10 ML: at 22:00

## 2018-06-16 RX ADMIN — TRAMADOL HYDROCHLORIDE 50 MG: 50 TABLET, FILM COATED ORAL at 18:38

## 2018-06-16 RX ADMIN — ARFORMOTEROL TARTRATE 15 MCG: 15 SOLUTION RESPIRATORY (INHALATION) at 20:25

## 2018-06-16 RX ADMIN — ACETAMINOPHEN 500 MG: 500 TABLET, FILM COATED ORAL at 18:38

## 2018-06-16 RX ADMIN — BUDESONIDE 500 MCG: 0.5 INHALANT RESPIRATORY (INHALATION) at 20:25

## 2018-06-16 RX ADMIN — Medication 10 ML: at 18:39

## 2018-06-16 RX ADMIN — Medication 10 ML: at 05:53

## 2018-06-16 NOTE — PROGRESS NOTES
Problem: Pressure Injury - Risk of  Goal: *Prevention of pressure injury  Document Eriberto Scale and appropriate interventions in the flowsheet.    Outcome: Progressing Towards Goal  Pressure Injury Interventions:       Moisture Interventions: Absorbent underpads, Apply protective barrier, creams and emollients    Activity Interventions: Increase time out of bed, Pressure redistribution bed/mattress(bed type), PT/OT evaluation    Mobility Interventions: HOB 30 degrees or less, Pressure redistribution bed/mattress (bed type), PT/OT evaluation    Nutrition Interventions: Document food/fluid/supplement intake

## 2018-06-16 NOTE — PROGRESS NOTES
Problem: Mobility Impaired (Adult and Pediatric)  Goal: *Acute Goals and Plan of Care (Insert Text)  PHYSICAL THERAPY SHORT TERM GOALS : ADHERING TO WB RESTRICTION OF PWB 30 LBS ON R  1. Patient will perform/complete all bed mobility with modified independence within 1 week(s). 2.  Patient will perform/complete sit <> stand  with modified independence within 1 week(s). 3.  Patient will ambulate 300 ft with RW with modified independence within 1 week(s). 4.  Patient ascend/descend 3 stairs with HR with modified independence within 1 week(s). Therapist Tomas Villaseñor  6/16/2018   Time Calculation: 25 mins  Outcome: Progressing Towards Goal  PHYSICAL THERAPY: Initial Assessment   INPATIENT: Medicare: Hospital Day: 2     Patient: Darlene Medrano (61 y.o. female)    Date: 6/16/2018  Primary Diagnosis: m19.071  Ankle arthritis  Ankle arthritis   Procedure(s) (LRB):  right tibiotalocalcaneal fusion with pop/sap (Right), 1 Day Post-Op   Precautions: PWB, Fall (30LBs PWB on R)  PWB on R       PLOF: I with ADLs and ambulation with RW     ASSESSMENT :  Patient supine in bed, agreeable to participation with PT. Family present. Patient reports lives in 1 story home with 3 EDIS with spouse and ambulated with a walker. MIN A  X 1 for supine to sit. Patient requesting to use BSC. MIN A x 1 for sit <> stand with verbal cuing for weightbearing precautions. Patient able to ambulate 3 feet and perform stand pivot transfer to [facility] with MIN  A x 1. Patient demonstrates poor dynamic standing balance, requiring assist to prevent posterior sway and LOB with stand pivot transfer. Able to correct posterior sway with verbal and manual cuing. Patient requesting to return to bed after toileting. MIN A x 1 for supine to sit. MAX x 2 to position in bed, needs within reach. C/o of R LE pain 6/10. Patient educated on strategy for bed mobility, transfer, and ambulation, weight bearing precaution, safety with mobility, and PT plan of care. Patient verbalizes and demonstrates understanding. Recommend discharge inpatient rehab facility or home with home health; patient is able to mobilize but is demonstrating decreased activity tolerance. Will adjust recommendation with further assessment of patient mobilization. Patient presents with deficits in:  Bed Mobility, Transfers, Gait, Strength and Stairs    Patient will benefit from skilled intervention to address the above impairments. Patients rehabilitation potential is considered to be Fair  Factors which may influence rehabilitation potential include:   []         None noted  []         Mental ability/status  [x]         Medical condition  []         Home/family situation and support systems  []         Safety awareness  []         Pain tolerance/management  []         Other:      PLAN :  Recommendations and Planned Interventions:  [x]           Bed Mobility Training             [x]    Neuromuscular Re-Education  [x]           Transfer Training                   []    Orthotic/Prosthetic Training  [x]           Gait Training                          []    Modalities  [x]           Therapeutic Exercises          []    Edema Management/Control  [x]           Therapeutic Activities            [x]    Patient and Family Training/Education  []           Other (comment):      EDUCATION:   Education:  Patient was educated on the following topics: Patient educated on strategy for bed mobility, transfer, and ambulation, weight bearing precaution, safety with mobility, and PT plan of care    Barriers to Learning/Limitations: None  Compensate with: visual, verbal, tactile, kinesthetic cues/model    Recommendations for the next treatment session: Gait and stair training   Frequency/Duration: Patient will be followed by physical therapy 1-2 times per day/4-7 days per week to address goals.   Discharge Recommendations: Home Health vs Inpatient Rehab  Further Equipment Recommendations for Discharge: rolling walker, shower chair (patient already has)  Factors which may impact discharge planning: Activity tolerance     SUBJECTIVE:   Patient stated Lucas Alcantar had this done before so I already have stuff at home.     OBJECTIVE DATA SUMMARY:     Past Medical History:   Diagnosis Date    Arm fracture, left     Chronic diarrhea     COPD (chronic obstructive pulmonary disease) (HCC)     Hypercholesterolemia     Hypertension     Mixed urge and stress incontinence     Proteinuria     Right ankle injury     Stroke [facility])      Past Surgical History:   Procedure Laterality Date    HX HYSTERECTOMY      HX ORTHOPAEDIC  2014    ARM AND ELBOW SX. Left bam    HX ORTHOPAEDIC      rt.elbow           Eval Complexity: History: MEDIUM  Complexity : 1-2 comorbidities / personal factors will impact the outcome/ POC Exam:MEDIUM Complexity : 3 Standardized tests and measures addressing body structure, function, activity limitation and / or participation in recreation  Presentation: MEDIUM Complexity : Evolving with changing characteristics  Clinical Decision Making:Medium Complexity [facility] Standing Balance Scale 1+/5 Overall Complexity:MEDIUM    G CODES:Mobility [id number] Current  CL= 60-79%   Goal  CJ= 20-39%. The severity rating is based on the [employer] Balance Scale 1+/5    [employer] Balance Scale 1+/5  0: Pt performs 25% or less of standing activity (Max assist) CN, 100% impaired. 1: Pt supports self with upper extremities but requires therapist assistance. Pt performs 25-50% of effort (Mod assist) CM, 80% to <100% impaired. 1+: Pt supports self with upper extremities but requires therapist assistance. Pt performs >50% effort. (Min assist). CL, 60% to <80% impaired. 2: Pt supports self independently with both upper extremities (walker, crutches, parallel bars). CL, 60% to <80% impaired. 2+: Pt support self independently with 1 upper extremity (cane, crutch, 1 parallel bar).  CK, 40% to <60% impaired. 3: Pt stands without upper extremity support for up to 30 seconds. CK, 40% to <60% impaired. 3+: Pt stands without upper extremity support for 30 seconds or greater. CJ, 20% to <40% impaired. 4: Pt independently moves and returns center of gravity 1-2 inches in one plane. CJ, 20% to <40% impaired. 4+: Pt independently moves and returns center of gravity 1-2 inches in multiple planes. CI, 1% to <20% impaired. 5: Pt independently moves and returns center of gravity in all planes greater than 2 inches. CH, 0% impaired. Prior Level of Function/Home Situation:   Home Situation  Home Environment: Private residence  # Steps to Enter: 3  Rails to Enter: Yes  One/Two Story Residence: One story  Living Alone: No  Support Systems: Spouse/Significant Other/Partner, Family member(s)  Patient Expects to be Discharged to[de-identified] Private residence  Current DME Used/Available at Home: Walker  Tub or Shower Type: Tub/Shower combination  Critical Behavior:  Neurologic State: Alert  Orientation Level: Oriented X4        Psychosocial  Patient Behaviors: Calm; Cooperative  Family  Behaviors: Calm; Cooperative  Purposeful Interaction: Yes    Functional Mobility:      Functional Status      Indep   (I)   Mod I   Super-vision   Min A   Mod A   Max A   Total A   Assist x2 Verbal cues Additional time Not tested   Comments   Rolling []  []  [] []    []    []  []  [] [] [] [x]    Supine to sit []  []  [] [x]  []  []  []  [] [] [] []    Sit to supine []  []  [] [x]  []  []  []  [] [] [] []    Sit to stand []  []  [] [x]  []  []  []  [] [] [] []    Stand to sit []  []  [] [x]  []  []  []  [] [] [] []    Bed to chair transfers []  []  [] [x]  []  []  []  [] [] [] []        Balance    Good   Fair   Poor   Unable   Not tested   Comments   Sitting static []  [x]  []  []  [] UE support   Sitting dynamic []  [x]  []  []  [] UE support   Standing static []  [x]  []  []  [] UE support   Standing dynamic []  []  [x]  []  [] UE support, cuing to prevent posterior sway and LOB       Mobility/Gait:   Level of Assistance: Minimum assistance  Assistive Device: rolling walker  Distance Ambulated: 4 feet     Left Lower Extremity: FWB  Right Lower Extremity: PWB  Base of Support: center of gravity altered  Speed/Gay: pace decreased (<100 feet/min) and shuffled  Step Length: left shortened  Swing Pattern: right asymmetrical  Stance: right decreased  Gait Abnormalities: antalgic, altered arm swing, shuffling gait and step to gait    Pain: R foot   Pre treatment pain level: 6  Post treatment pain level: 6    Vital Signs  Temp: 98.4 °F (36.9 °C)     Pulse (Heart Rate): 64     BP: 106/62     Resp Rate: 16     O2 Sat (%): 97 %    Activity Tolerance:   Fair, limited by fatigue     Please refer to the flowsheet for vital signs taken during this treatment. After treatment:   []         Patient left in no apparent distress sitting up in chair  [x]         Patient left in no apparent distress in bed  [x]         Call bell left within reach  [x]         Nursing notified  []         Caregiver present  []         Bed alarm activated    COMMUNICATION/EDUCATION: Verbalizes and demonstrates understanding. [x]         Fall prevention education was provided and the patient/caregiver indicated understanding. [x]         Patient/family have participated as able in goal setting and plan of care. [x]         Patient/family agree to work toward stated goals and plan of care. []         Patient understands intent and goals of therapy, but is neutral about his/her participation. []         Patient is unable to participate in goal setting and plan of care.         Thank you for this referral.  Marquis Garcia   Time Calculation: 25 mins

## 2018-06-16 NOTE — OP NOTES
[facility]  OPERATIVE REPORT    Saud Serrato  MR#: [id number]  : 1945  ACCOUNT #: [de-identified]   DATE OF SERVICE: 06/15/2018    PREOPERATIVE DIAGNOSIS:  End-stage ankle arthritis with severe varus deformity. POSTOPERATIVE DIAGNOSIS:  End-stage ankle arthritis with severe varus deformity. PROCEDURE PERFORMED:  Right tibiotalocalcaneal fusion. SURGEON:  Duglas Miranda MD     ASSISTANT:  Destiney La CSA    ANESTHESIA:  LMA with block. ESTIMATED BLOOD LOSS:  10 mL    FLUIDS:  Crystalloid. URINE OUTPUT:  None. SPECIMENS REMOVED:  None. IMPLANTS:  Orthofix tibiotalocalcaneal fusion nail--10 x 250 mm plus large Awilda Elite bone graft. COUNTS:  Correct at the end of the case. CONDITION:  Stable to PACU. FINDINGS:  Excellent correction achieved. COMPLICATIONS:  none     INDICATION AND HISTORY:  The patient is a patient who I treated several years ago for a limb threatening ankle fracture with a full thickness soft tissue loss. She did well for several years after healing, but has had progressively worsening ankle arthritis resulting in varus collapse. At the office visit recently where the decision for a reconstruction surgery was made, I was concerned that her collapse had progressed to a point where she may end up with a stress fracture or recurrent ankle fracture. Prior to surgery, I discussed the risks and benefits with her and her . I clearly expressed that this is limb salvage surgery and there is potential for the loss of leg. After discussing that plus the standard surgical risks, they strongly wished to proceed. DESCRIPTION OF PROCEDURE:  The patient was identified, procedure verified. After the successful induction of LMA anesthesia, the patient's right lower leg was prepped and draped in routine orthopedic sterile fashion. I performed a comprehensive timeout. Presurgical juju was clearly visible from the field. Procedure was begun by first making a lateral incision over the distal fibula. I carried this through the skin. Then circumferentially exposed the distal fibula. I used a microsagittal saw to make an osteotomy slightly oblique. I then removed the distal aspect of the fibula. This exposed the lateral aspect of the calcaneus, talus and subtalar and tibiotalar joints. There was some abnormal, possibly avascular bone at the anterior distal tibia. I excised back until I encountered healthy-appearing cancellous bleeding bone in the tibia by creating a flat cut. I then made a similar flat cut on the top of the talus. This allowed me to reduce the ankle to a neutral position and rotate appropriately. The patient had a somewhat prominent medial malleolus fragment, but due to the fact that that is where she had a large soft tissue area covered with a skin graft at her previous surgery, I left this alone intentionally. I then removed the cartilage from the posterior and middle facets of the subtalar joint. Once both joints were prepared, I drilled them numerous times. I then began placement of the nail. A guidewire for the tibiotalocalcaneal fusion nail was placed through the plantar aspect of the calcaneus, across the subtalar joint, across the middle of the talar body and into the tibia. Alignment was confirmed on AP, lateral and axial views. Once noted to be ideal, I made a skin incision plantarly in the foot longitudinally. I bluntly dissected down to the calcaneus and my surgical assistant retracted the surrounding soft tissue. The drill trocar was placed directly against the bone. I did 3 sequential drillings with sequentially larger drills to open the calcaneus, talus and distal tibia. Once this was completed, I removed the drill, guidewires and passed the ball-tip guidewire. Its position was confirmed in AP and lateral views of the tibia. I then sequentially reamed to a size of 11.5 mm.   This had excellent cortical fit proximally in the tibia. A 250 mm nail length x 10 mm diameter was chosen. The nail was then inserted to an appropriate depth. I confirmed this radiographically and fixated the nail proximally with a Dynamic screw. At this point in time, I used the external footplate for compression, compressing both the subtalar and tibiotalar joints. I intentionally left the talus unfixed with a goal of placing fixation of the calcaneus and a Dynamic screw in the tibia to allow for dynamization of the nail with weightbearing. The calcaneus was fixated with a lateral to medial screw and a posterior to anterior screw--position of these were both confirmed radiographically. Once fixation was complete, I placed a 0 end cap at the end of the nail. It should be noted that prior to fixating and compressing the nail, I opened the tibiotalar and subtalar joints and inserted a large Awilda Elite bone graft. The incisions were closed in a layered fashion and an exceptionally well-padded splint was applied. The patient was awoken and transferred to PACU in stable condition having tolerated procedure well. POSTOPERATIVE PLAN:  The patient will be seen back in the office in 2 weeks. Sutures removed if appropriate. She will go into a walking cast.  At that point in time, we will allow her if incisions are healed to progress to full weightbearing in the cast with a cast shoe. X-rays at the 6 week postop visit and we would like to place her back into an additional walking cast at that time.       MD CHOCO Gomez /   D: 06/16/2018 11:24     T: 06/16/2018 12:25  JOB #: [id number]

## 2018-06-16 NOTE — PROGRESS NOTES
Chart reviewed and pt/  verified demographics. . She lives with  and is independent with ADL. She used outpatient PT 2 yrs ago- [address] [place] [address] and expect she will go back there at some point after dc. She has a shower seat, walker , wheel chair and cane at home. Reason for Admission:   Planned  admit                  RRAT Score:     18             Do you (patient/family) have any concerns for transition/discharge? no              Plan for utilizing home health:     None at this time,     Likelihood of readmission? Low but pt requiring nebulizers here, uses only [facility] at home. Transition of Care Plan:     Home with . Have DME at home.

## 2018-06-16 NOTE — PROGRESS NOTES
Problem: Falls - Risk of  Goal: *Absence of Falls  Document Noel Fall Risk and appropriate interventions in the flowsheet.    Outcome: Progressing Towards Goal  Fall Risk Interventions:  Mobility Interventions: Patient to call before getting OOB, PT Consult for mobility concerns, Utilize walker, cane, or other assitive device, Utilize gait belt for transfers/ambulation         Medication Interventions: Evaluate medications/consider consulting pharmacy, Patient to call before getting OOB, Teach patient to arise slowly

## 2018-06-16 NOTE — PROGRESS NOTES
Progress Note      Patient: Tad Kussmaul               Sex: female          DOA: 6/15/2018       YOB: 1945      Age:  67 y.o.        LOS:  LOS: 0 days             CHIEF COMPLAINT:    Subjective:     She denies CP or SOB.  and nursing care partner were at bedside. Objective:      Visit Vitals    /62 (BP 1 Location: Right arm, BP Patient Position: At rest)    Pulse 64    Temp 98.4 °F (36.9 °C)    Resp 16    Ht 5' 4\" (1.626 m)    Wt 146 lb (66.2 kg)    SpO2 97%    BMI 25.06 kg/m2       Physical Exam:  GEN: AAO X 3, NAD  CVS: Normal S1S2, RRR  RESP: CTAB  ABD: Soft, NT/ND, +BS  EXT: RLE in cast and no edema noted on LLE  NEURO: CN 2 - 12 intact with no focal deficits noted. Lab/Data Reviewed:  CMP:   Lab Results   Component Value Date/Time     06/16/2018 10:00 AM    K 3.8 06/16/2018 10:00 AM    CL 98 (L) 06/16/2018 10:00 AM    CO2 31 06/16/2018 10:00 AM    AGAP 8 06/16/2018 10:00 AM     (H) 06/16/2018 10:00 AM    BUN 16 06/16/2018 10:00 AM    CREA 1.12 06/16/2018 10:00 AM    GFRAA 58 (L) 06/16/2018 10:00 AM    GFRNA 48 (L) 06/16/2018 10:00 AM    CA 8.5 06/16/2018 10:00 AM     CBC:   Lab Results   Component Value Date/Time    WBC 12.9 06/16/2018 10:00 AM    HGB 10.8 (L) 06/16/2018 10:00 AM    HCT 33.6 (L) 06/16/2018 10:00 AM     06/16/2018 10:00 AM           Assessment/Plan     Principal Problem:    Chronic pain of right ankle (6/15/2018)    Active Problems:    Hypertension (4/4/2016)      Hypercholesterolemia (4/4/2016)      COPD (chronic obstructive pulmonary disease) ([address] (4/4/2016)      Ankle arthritis (6/15/2018)        Plan:  End-stage right ankle arthritis with severe varus deformity status post right tibiotalocalcaneal fusion. Continue analgesics as needed and diet advancement per Ortho. Incentive spirometry ordered to be used when awake. Continue daily PT/OT as tolerated. Ortho following and appreciate help. Hypertension.  Controlled and currently not taking BP meds. Continue close BP monitoring. COPD. Continue Brovana and Pulmicort and supplemental oxygenation titrated to maintain sats greater than 91% at all times and currently maintaining optimal saturation. DVT prophylaxis.  Loverainax        Junior Nolasco DO, MPH  Internal Medicine

## 2018-06-16 NOTE — DISCHARGE INSTRUCTIONS
DISCHARGE SUMMARY from Nurse    PATIENT INSTRUCTIONS:    After general anesthesia or intravenous sedation, for 24 hours or while taking prescription Narcotics:  · Limit your activities  · Do not drive and operate hazardous machinery  · Do not make important personal or business decisions  · Do  not drink alcoholic beverages  · If you have not urinated within 8 hours after discharge, please contact your surgeon on call. Report the following to your surgeon:  · Excessive pain, swelling, redness or odor of or around the surgical area  · Temperature over 100.5  · Nausea and vomiting lasting longer than 4 hours or if unable to take medications  · Any signs of decreased circulation or nerve impairment to extremity: change in color, persistent  numbness, tingling, coldness or increase pain  · Any questions    What to do at Home:  Recommended activity: Activity as tolerated, see surgeon's instructions. If you experience any of the symptoms above, please follow up with Dr. Leah Field. *  Please give a list of your current medications to your Primary Care Provider. *  Please update this list whenever your medications are discontinued, doses are      changed, or new medications (including over-the-counter products) are added. *  Please carry medication information at all times in case of emergency situations. These are general instructions for a healthy lifestyle:    No smoking/ No tobacco products/ Avoid exposure to second hand smoke  Surgeon General's Warning:  Quitting smoking now greatly reduces serious risk to your health.     Obesity, smoking, and sedentary lifestyle greatly increases your risk for illness    A healthy diet, regular physical exercise & weight monitoring are important for maintaining a healthy lifestyle    You may be retaining fluid if you have a history of heart failure or if you experience any of the following symptoms:  Weight gain of 3 pounds or more overnight or 5 pounds in a week, increased swelling in our hands or feet or shortness of breath while lying flat in bed. Please call your doctor as soon as you notice any of these symptoms; do not wait until your next office visit. Recognize signs and symptoms of STROKE:    F-face looks uneven    A-arms unable to move or move unevenly    S-speech slurred or non-existent    T-time-call 911 as soon as signs and symptoms begin-DO NOT go       Back to bed or wait to see if you get better-TIME IS BRAIN. Warning Signs of HEART ATTACK     Call 911 if you have these symptoms:   Chest discomfort. Most heart attacks involve discomfort in the center of the chest that lasts more than a few minutes, or that goes away and comes back. It can feel like uncomfortable pressure, squeezing, fullness, or pain.  Discomfort in other areas of the upper body. Symptoms can include pain or discomfort in one or both arms, the back, neck, jaw, or stomach.  Shortness of breath with or without chest discomfort.  Other signs may include breaking out in a cold sweat, nausea, or lightheadedness. Don't wait more than five minutes to call 911 - MINUTES MATTER! Fast action can save your life. Calling 911 is almost always the fastest way to get lifesaving treatment. Emergency Medical Services staff can begin treatment when they arrive -- up to an hour sooner than if someone gets to the hospital by car. The discharge information has been reviewed with the patient. The patient verbalized understanding. Discharge medications reviewed with the patient and appropriate educational materials and side effects teaching were provided. Patient armband removed and shredded.   ___________________________________________________________________________________________________________________________________

## 2018-06-16 NOTE — ROUTINE PROCESS
Bedside and Verbal shift change report given to Prince Wilson (oncoming nurse) by Lesvia Miramontes (offgoing nurse). Report included the following information SBAR, Kardex, MAR and Recent Results.

## 2018-06-16 NOTE — PROGRESS NOTES
Ortho /[facility]    POD#1 s/p right TTC fusion    Pain controlled. Visit Vitals    /68 (BP 1 Location: Right arm, BP Patient Position: At rest)    Pulse 72    Temp 97.8 °F (36.6 °C)    Resp 18    Ht 5' 4\" (1.626 m)    Wt 66.2 kg (146 lb)    SpO2 (!) 84%    BMI 25.06 kg/m2         Right leg splint intact without drainage. Active motor function and sensation to LT in toes. Brisk cap refill in toes. No evidence of DVT. Wet cough    I/P - doing well from surgical standpoint. [employer] NP to see patient right now due to SpO2 and respiratory issues. Plan to keep patient today due to her hypoxemia. Will continue to follow. Labs ordered.

## 2018-06-16 NOTE — PROGRESS NOTES
2015: Received patient in bed awake,alert and oriented x2-3 . No signs of distress. Orientate patient to the unit,safety assess,skin check done with Tamar Blackwood ,RN. Skin is intact. Bed low and locked. Right foot elevated with 2 pillows. Call bell withinr each. Will monitor. 0100: In bed sleeping,no other concern at this time. Will monitor  0600 : In bed resting quietly,uneventful night,call bell within reach. Will continue monitoring.

## 2018-06-16 NOTE — PROGRESS NOTES
1296: PT orders received and chart reviewed. Attempted evaluation, nursing requesting PT to hold until patient's stats are more appropriate for mobility. Will f/u with patient at a later time.

## 2018-06-16 NOTE — PROGRESS NOTES
2082 - received pt in room. Pt resting in bed. Pain rated 6/10 to right foot. 5677 - assessment completed. Pt is AOx3. VSS. Right leg elevated. Pt resting quietly. Call bell at bedside. No distress noted. Will continue to monitor. 1137 - observed pt resting quietly in bed. Encouraged use of ICS, pt only able to attain 500cc    1454 - observed pt working with PT, up to [address], no distress noted.  at bedside. 1630 - pt resting quietly,  and visitor at bedside. 1845 -  Pt assisted up to bedside commode.

## 2018-06-17 ENCOUNTER — APPOINTMENT (OUTPATIENT)
Dept: GENERAL RADIOLOGY | Age: 73
DRG: 492 | End: 2018-06-17
Attending: PHYSICIAN ASSISTANT
Payer: MEDICARE

## 2018-06-17 LAB
ARTERIAL PATENCY WRIST A: ABNORMAL
BASE EXCESS BLD CALC-SCNC: 4 MMOL/L
BDY SITE: ABNORMAL
BODY TEMPERATURE: 98.6
GAS FLOW.O2 O2 DELIVERY SYS: ABNORMAL L/MIN
GAS FLOW.O2 SETTING OXYMISER: 2 L/M
HCO3 BLD-SCNC: 28.9 MMOL/L (ref 22–26)
O2/TOTAL GAS SETTING VFR VENT: 0.28 %
PCO2 BLD: 45.8 MMHG (ref 35–45)
PH BLD: 7.41 [PH] (ref 7.35–7.45)
PO2 BLD: 75 MMHG (ref 80–100)
SAO2 % BLD: 95 % (ref 92–97)
SERVICE CMNT-IMP: ABNORMAL
SPECIMEN TYPE: ABNORMAL
TOTAL RESP. RATE, ITRR: 15

## 2018-06-17 PROCEDURE — 71045 X-RAY EXAM CHEST 1 VIEW: CPT

## 2018-06-17 PROCEDURE — 74011250636 HC RX REV CODE- 250/636: Performed by: ORTHOPAEDIC SURGERY

## 2018-06-17 PROCEDURE — 65270000029 HC RM PRIVATE

## 2018-06-17 PROCEDURE — 77010033678 HC OXYGEN DAILY

## 2018-06-17 PROCEDURE — 74011250637 HC RX REV CODE- 250/637: Performed by: ORTHOPAEDIC SURGERY

## 2018-06-17 PROCEDURE — 97110 THERAPEUTIC EXERCISES: CPT

## 2018-06-17 PROCEDURE — 74011000250 HC RX REV CODE- 250: Performed by: HOSPITALIST

## 2018-06-17 PROCEDURE — 97530 THERAPEUTIC ACTIVITIES: CPT

## 2018-06-17 PROCEDURE — 94640 AIRWAY INHALATION TREATMENT: CPT

## 2018-06-17 PROCEDURE — 77030021352 HC CBL LD SYS DISP COVD -B

## 2018-06-17 PROCEDURE — 36600 WITHDRAWAL OF ARTERIAL BLOOD: CPT

## 2018-06-17 PROCEDURE — 82803 BLOOD GASES ANY COMBINATION: CPT

## 2018-06-17 PROCEDURE — 74011250637 HC RX REV CODE- 250/637: Performed by: HOSPITALIST

## 2018-06-17 RX ORDER — FUROSEMIDE 20 MG/1
20 TABLET ORAL DAILY
Status: DISCONTINUED | OUTPATIENT
Start: 2018-06-17 | End: 2018-06-19 | Stop reason: HOSPADM

## 2018-06-17 RX ORDER — IPRATROPIUM BROMIDE AND ALBUTEROL SULFATE 2.5; .5 MG/3ML; MG/3ML
3 SOLUTION RESPIRATORY (INHALATION)
Status: DISCONTINUED | OUTPATIENT
Start: 2018-06-17 | End: 2018-06-18

## 2018-06-17 RX ORDER — HYDROMORPHONE HYDROCHLORIDE 2 MG/1
2 TABLET ORAL
Status: DISCONTINUED | OUTPATIENT
Start: 2018-06-17 | End: 2018-06-19 | Stop reason: HOSPADM

## 2018-06-17 RX ADMIN — ACETAMINOPHEN 500 MG: 500 TABLET, FILM COATED ORAL at 12:55

## 2018-06-17 RX ADMIN — ACETAMINOPHEN 500 MG: 500 TABLET, FILM COATED ORAL at 23:43

## 2018-06-17 RX ADMIN — Medication 10 ML: at 18:34

## 2018-06-17 RX ADMIN — ARFORMOTEROL TARTRATE 15 MCG: 15 SOLUTION RESPIRATORY (INHALATION) at 09:47

## 2018-06-17 RX ADMIN — IPRATROPIUM BROMIDE AND ALBUTEROL SULFATE 3 ML: .5; 3 SOLUTION RESPIRATORY (INHALATION) at 20:28

## 2018-06-17 RX ADMIN — TRAMADOL HYDROCHLORIDE 50 MG: 50 TABLET, FILM COATED ORAL at 05:41

## 2018-06-17 RX ADMIN — Medication 10 ML: at 21:42

## 2018-06-17 RX ADMIN — ACETAMINOPHEN 500 MG: 500 TABLET, FILM COATED ORAL at 05:40

## 2018-06-17 RX ADMIN — ALBUTEROL SULFATE 2.5 MG: 2.5 SOLUTION RESPIRATORY (INHALATION) at 00:45

## 2018-06-17 RX ADMIN — BUDESONIDE 500 MCG: 0.5 INHALANT RESPIRATORY (INHALATION) at 09:47

## 2018-06-17 RX ADMIN — TRAMADOL HYDROCHLORIDE 50 MG: 50 TABLET, FILM COATED ORAL at 00:41

## 2018-06-17 RX ADMIN — ACETAMINOPHEN 500 MG: 500 TABLET, FILM COATED ORAL at 18:43

## 2018-06-17 RX ADMIN — TRAMADOL HYDROCHLORIDE 50 MG: 50 TABLET, FILM COATED ORAL at 18:43

## 2018-06-17 RX ADMIN — ENOXAPARIN SODIUM 40 MG: 100 INJECTION SUBCUTANEOUS at 06:41

## 2018-06-17 RX ADMIN — TRAMADOL HYDROCHLORIDE 50 MG: 50 TABLET, FILM COATED ORAL at 23:43

## 2018-06-17 RX ADMIN — FUROSEMIDE 20 MG: 20 TABLET ORAL at 15:56

## 2018-06-17 RX ADMIN — Medication 10 ML: at 05:41

## 2018-06-17 RX ADMIN — ROSUVASTATIN CALCIUM 10 MG: 10 TABLET, FILM COATED ORAL at 21:42

## 2018-06-17 RX ADMIN — ACETAMINOPHEN 500 MG: 500 TABLET, FILM COATED ORAL at 00:41

## 2018-06-17 RX ADMIN — BUDESONIDE 500 MCG: 0.5 INHALANT RESPIRATORY (INHALATION) at 20:28

## 2018-06-17 RX ADMIN — TRAMADOL HYDROCHLORIDE 50 MG: 50 TABLET, FILM COATED ORAL at 12:55

## 2018-06-17 RX ADMIN — ARFORMOTEROL TARTRATE 15 MCG: 15 SOLUTION RESPIRATORY (INHALATION) at 20:28

## 2018-06-17 RX ADMIN — HYDROMORPHONE HYDROCHLORIDE 2 MG: 2 TABLET ORAL at 02:34

## 2018-06-17 NOTE — PROGRESS NOTES
met with patient completed the initial Spiritual Assessment of the patient, and offered Pastoral Care, see flow sheets for interventions.  extended the assurance of prayer and spiritual care materials. Patient does not have any Roman Catholic/cultural needs that will affect patients preferences in health care. The patient was informed that chaplains will continue to follow and will provide pastoral care on an as needed/requested basis.       Chuckie, MPH,   [place]  Spiritual Care Department

## 2018-06-17 NOTE — PROGRESS NOTES
1938: Received patient in bed awake,alert and oriented x3,forgetful at times,needs to re orient No signs of distress. Right foot elevated with 2 pillows,on oxygen 2 liters via nasal cannula. Denies any pain. Bed low and locked. Call bell within reach. Will monitor. 0040: Notice patient SOB, slight wheezing,producrtive cough with small amount of white thick fluid noted, sit up patient higher in bed ,vital signs taken as follows  98.7,93,20,94 percent on 2 liters ,155/80  , breathing treatment given. 0100: Ask respiratory therapist Julio Mcdaniel to see patients, she states \" no changes as earlier when I give the breathing treatment,no wheezing already\". Patient states \" she is okay\". 0200: In bed resting quietly, lungs  diminished  Still coughing,encouraged patient  to cough out phlegm to get more clearer airway  verbalizes understanding ,will continue monitoring,still on oxygen 2 liter via cannula,saturation now 95 %. 2376: Assisted  patient to [facility], there is a degree of difficulty,want to sit up in recliner,now resting up in recliner ,oxygen 2 liters via cannula. Call bell within reach. Will continue monitor.

## 2018-06-17 NOTE — PROGRESS NOTES
Progress Note      Patient: Bakari Sweeney               Sex: female          DOA: 6/15/2018       YOB: 1945      Age:  67 y.o.        LOS:  LOS: 1 day             CHIEF COMPLAINT:    Subjective:     She had SOB earlier when seen by Ortho PA that is currently resolved. She denies CP and  was at bedside. Objective:      Visit Vitals    /72 (BP 1 Location: Left arm, BP Patient Position: At rest)    Pulse 77    Temp 98.5 °F (36.9 °C)    Resp 16    Ht 5' 4\" (1.626 m)    Wt 146 lb (66.2 kg)    SpO2 97%    BMI 25.06 kg/m2       Physical Exam:  GEN: AAO X 3, NAD  CVS: Normal S1S2, RRR  RESP: Moderate AE bilaterally slightly decreased at left lung base  ABD: Soft, NT/ND, +BS  EXT: RLE in cast and no edema noted on LLE  NEURO: CN 2 - 12 intact with no focal deficits noted.         Lab/Data Reviewed:  CMP: No results found for: NA, K, CL, CO2, AGAP, GLU, BUN, CREA, GFRAA, GFRNA, CA, MG, PHOS, ALB, TBIL, TP, ALB, GLOB, AGRAT, SGOT, ALT, GPT  CBC: No results found for: WBC, HGB, HGBEXT, HCT, HCTEXT, PLT, PLTEXT, HGBEXT, HCTEXT, PLTEXT        Assessment/Plan     Principal Problem:    Chronic pain of right ankle (6/15/2018)    Active Problems:    Hypertension (4/4/2016)      Hypercholesterolemia (4/4/2016)      COPD (chronic obstructive pulmonary disease) ([facility] [address] (4/4/2016)      Ankle arthritis (6/15/2018)      Chest imaging today. 1.  Suggestion of new small left pleural effusion with probable adjacent  subsegmental atelectasis. 2. Focal consolidation is not seen. Plan:  End-stage right ankle arthritis with severe varus deformity status post right tibiotalocalcaneal fusion. Continue analgesics as needed and diet advancement per Ortho. Stressed to pt the importance of using incentive spirometry when awake especially as she is s/p recent surgery and Atelectasis noted on chest imaging and she verbalized understanding. Continue daily PT/OT as tolerated.  Ortho following and appreciate help.  Hypertension. Controlled and currently not taking BP meds. Continue close BP monitoring. COPD. Duonebs Q 6 started today. Continue Brovana and Pulmicort and supplemental oxygenation titrated to maintain sats greater than 91% at all times and currently maintaining optimal saturation. Asked nurse to check ambulatory oxygenation without oxygen and it was 91% and will check again tomorrow. New small left pleural effusion noted on chest imaging today. Lasix 20 mg daily started today  DVT prophylaxis.  Sue Rosa, MPH  Internal Medicine

## 2018-06-17 NOTE — ROUTINE PROCESS
Assumed care of patient at [address] report received from Francisco James RN. Patient in recliner alert & oriented, denies SOB. Call bell within reach. RLE dressing intact, able to wiggle toes, sensation present. All safety precautions in place - safety maintained. 2002 -  Pulse ox sat of 88% - 92% on RA, denies sob & nausea. 2150 -  Assisted OOB to bedside commode,voiding  & back to recliner. 2343 - Pain med given to patient. Pain rate of 5/10.    0040 - Pt verbalized some relief of pain. 0330 - Pt resting quietly and no acute distress noted. Minerva Brunner Notified Dr. Aundrea Lujan of critical lab results of Potassium 2.9. Orders received. See mar. 4136 -  Bedside and Verbal shift change report given to Mayr Crum RN (oncoming nurse) by Nereida Goodwin RN BSN (offgoing nurse). Report given with SBAR, Kardex, Intake/Output, MAR and Recent Results.

## 2018-06-17 NOTE — PROGRESS NOTES
2139 - received pt in room. Pt is up to recliner. AO. Pain rated 5/10 to right foot. Asking for her . 5645 - assessment completed. Pt is AOx4. VSS. Up to recliner, tolerating well. Assisted pt to call her . Periods of forgetfulness. Call bell at chair side. No distress noted. 8320 - observed respiratory rounding in room. 1030 - pt assisted up to [facility] with use of walker, tolerated well. 50cc output. 1040 - observed Dr. Gerry Narayanan rounding in room. Pt up in recliner,  at bedside. 1255 - pt up to recliner eating, medicated for pain 5/10. No distress noted.  in room. 1419 - pt assisted up to [facility] via walker. Small incontinent episode in recliner. 1430 - received orders from Dr. Gerry Narayanan, walk pt w/o oxygen to review her O2 saturation. 88% - 91% acceptable. 1545 - received call from tele, pt's O2 dropped to 87% for 3 min. 1745 - trial run, disconnected O2 to see how pt tolerates on room air. 1843 - pt up to recliner, incontinent episode, pt assisted with wash-up.

## 2018-06-17 NOTE — PROGRESS NOTES
Progress Note      Post Operative Day: 2    Assessment:    1. Status post  FUSION FOOT Gentry Moran [[id number]] (SUBTALAR JOINT FUSION) for m19.071  Ankle arthritis  Ankle arthritis 6/15/2018   2. Dyspnea - peripheral cyanosis (nail bed)      PLAN:    1. Mobilize. Continue P.T.  2. WBAT  3. Lovenox for DVT prophylaxis  4. Continue supplemental O2, add CXR and arterial blood milton  5. Continue respiratory therapy treatment plan           HPI: Franco José is a 67 y.o. female patient without new orthopaedic changes. Patient requiring supplemental oxygen. States that she does not use this at baseline. History of COPD - ? Exacerbation. Blood pressure 122/72, pulse 77, temperature 98.5 °F (36.9 °C), resp. rate 16, height 5' 4\" (1.626 m), weight 66.2 kg (146 lb), SpO2 97 %. CBC w/Diff   Lab Results   Component Value Date/Time    WBC 12.9 06/16/2018 10:00 AM    RBC 3.40 (L) 06/16/2018 10:00 AM    HGB 10.8 (L) 06/16/2018 10:00 AM    HCT 33.6 (L) 06/16/2018 10:00 AM    MCV 98.8 (H) 06/16/2018 10:00 AM    MCH 31.8 06/16/2018 10:00 AM    MCHC 32.1 06/16/2018 10:00 AM    RDW 12.2 06/16/2018 10:00 AM    Lab Results   Component Value Date/Time    MONOS 8 06/16/2018 10:00 AM    EOS 1 06/16/2018 10:00 AM    BASOS 0 06/16/2018 10:00 AM    RDW 12.2 06/16/2018 10:00 AM             Physical Assessment:  General: well developed and well nourished and alert. Outward signs of dyspnea, Nasal cannula in-place    Patient with concerns for COPD exacerbation; has outward appearance of dyspnea; has been requiring supplemental oxygen since yesterday. Hospitalist was consulted, but no evidence of consult note yesterday. Patient with cyanosis in fingers (nail beds). Wound: no drainage, covered with sterile dressing   Extremities:  Neurovascular intact RLE. Exposed compartments soft and NT. Able to contract quadriceps. Plantar and dorsiflexion intact. Palpable DP/PT pulses noted.  Denies paresthesias   Dressing:  CDI   DVT Exam: No exam evidence to suggest DVT. Compartments soft and NT.           Radiology: no new ortho studies, CXR pending         - Umberto Kelley PA-C  6/17/2018    Office [phone]

## 2018-06-17 NOTE — PROGRESS NOTES
Problem: Falls - Risk of  Goal: *Absence of Falls  Document Noel Fall Risk and appropriate interventions in the flowsheet. Fall Risk Interventions:  Mobility Interventions: Patient to call before getting OOB, PT Consult for mobility concerns, Utilize walker, cane, or other assitive device, Utilize gait belt for transfers/ambulation         Medication Interventions: Patient to call before getting OOB, Teach patient to arise slowly         History of Falls Interventions: Door open when patient unattended        Problem: Pressure Injury - Risk of  Goal: *Prevention of pressure injury  Document Eriberto Scale and appropriate interventions in the flowsheet.    Outcome: Progressing Towards Goal  Pressure Injury Interventions:       Moisture Interventions: Absorbent underpads    Activity Interventions: PT/OT evaluation, Pressure redistribution bed/mattress(bed type), Increase time out of bed    Mobility Interventions: PT/OT evaluation, Pressure redistribution bed/mattress (bed type), HOB 30 degrees or less    Nutrition Interventions: Document food/fluid/supplement intake

## 2018-06-17 NOTE — PROGRESS NOTES
Problem: Falls - Risk of  Goal: *Absence of Falls  Document Noel Fall Risk and appropriate interventions in the flowsheet.    Outcome: Progressing Towards Goal  Fall Risk Interventions:  Mobility Interventions: Patient to call before getting OOB         Medication Interventions: Evaluate medications/consider consulting pharmacy, Patient to call before getting OOB, Teach patient to arise slowly    Elimination Interventions: Call light in reach, Patient to call for help with toileting needs, Toilet paper/wipes in reach    History of Falls Interventions: Door open when patient unattended

## 2018-06-17 NOTE — PROGRESS NOTES
Problem: Mobility Impaired (Adult and Pediatric)  Goal: *Acute Goals and Plan of Care (Insert Text)  PHYSICAL THERAPY SHORT TERM GOALS :   1.  Patient will perform/complete all bed mobility with modified independence within 1 week(s). 2.  Patient will perform/complete sit <> stand  with modified independence within 1 week(s). 3.  Patient will ambulate 300 ft with RW with modified independence within 1 week(s). 4.  Patient ascend/descend 3 stairs with HR with modified independence within 1 week(s). Therapist Meredith Powell  6/16/2018   Time Calculation: 25 mins   Outcome: Progressing Towards Goal    PHYSICAL THERAPY: Daily TREATMENT Note   INPATIENT: Medicare: Hospital Day: 3     Patient: Nichole Cash (57 y.o. female)    Date: 6/17/2018  Primary Diagnosis: m19.071  Ankle arthritis  Ankle arthritis   Procedure(s) (LRB):  right tibiotalocalcaneal fusion with pop/sap (Right), 2 Days Post-Op,   Precautions: PWB, Fall (30LBs PWB on R)  PWB    Chart, physical therapy assessment, plan of care and goals were reviewed. PLOF:Independent    ASSESSMENT:  Pt somewhat confused this am, requires frequent cues to stay on task. Pt able to verbalize understanding of weight bearing restriction, instructed in safe hand placement and sit<>stand transfers with RW. Pt stood X1 min, for 2 trials reporting dizziness throughout, and perseverating on calling spouse; pt safety returned to seated and provided assistance with making phone call. Progression toward goals:          Improving slowly and progressing toward goals       PLAN:  Patient continues to benefit from skilled intervention to address the above impairments. Continue treatment per established plan of care.     EDUCATION:   Education:  Patient was educated on the following topics: exercises, safety transfers   Barriers to Learning/Limitations: yes;  altered mental status (i.e.Sedation, Confusion)  Compensate with: visual, verbal, tactile, kinesthetic cues/model    Discharge Recommendations:  Home Health and [place]  Further Equipment Recommendations for Discharge:  N/A  Factors which may impact discharge planning: none     SUBJECTIVE:   Patient stated Is that my niece in the soliz? Irina Vegarenae? Go check the other rooms for her. Kylie Covingtonrra    OBJECTIVE DATA SUMMARY:   Critical Behavior:  Neurologic State: Alert  Orientation Level: Oriented X4          G CODE:Mobility [id number] Current  CL= 60-79%   Goal  CJ= 20-39%. The severity rating is based on the [facility] Standing Balance Scale  0: Pt performs 25% or less of standing activity (Max assist) CN, 100% impaired. 1: Pt supports self with upper extremities but requires therapist assistance. Pt performs 25-50% of effort (Mod assist) CM, 80% to <100% impaired. 1+: Pt supports self with upper extremities but requires therapist assistance. Pt performs >50% effort. (Min assist). CL, 60% to <80% impaired. 2: Pt supports self independently with both upper extremities (walker, crutches, parallel bars). CL, 60% to <80% impaired. 2+: Pt support self independently with 1 upper extremity (cane, crutch, 1 parallel bar). CK, 40% to <60% impaired. 3: Pt stands without upper extremity support for up to 30 seconds. CK, 40% to <60% impaired. 3+: Pt stands without upper extremity support for 30 seconds or greater. CJ, 20% to <40% impaired. 4: Pt independently moves and returns center of gravity 1-2 inches in one plane. CJ, 20% to <40% impaired. 4+: Pt independently moves and returns center of gravity 1-2 inches in multiple planes. CI, 1% to <20% impaired. 5: Pt independently moves and returns center of gravity in all planes greater than 2 inches. CH, 0% impaired.       Functional Mobility:      Functional Status      Indep   (I)   Mod I   Super-vision   Min A   Mod A   Max A   Total A   Assist x2 Verbal cues Additional time Not tested   Comments   Rolling []  []  [] []    []    []  []  [] [] [] []    Supine to sit []  []  [] []  []  []  []  [] [] [] []    Sit to supine []  []  [] []  []  []  []  [] [] [] []    Sit to stand []  []  [x]CGA []  []  []  []  [] [x] [x] []    Stand to sit []  []  [x]CGA []  []  []  []  [] [x] [x] []    Bed to chair transfers []  []  [] []  []  []  []  [] [] [] []        Balance    Good   Fair   Poor   Unable   Not tested   Comments   Sitting static [x]  []  []  []  []    Sitting dynamic [x]  []  []  []  []    Standing static []  [x]  []  []  []    Standing dynamic []  []  []  []  [x]            Therapeutic Exercises:         EXERCISE   Sets   Reps   Active Active Assist   Passive Self ROM   Comments   Ankle Pumps 1 10  [] [] [] []    Quad Sets/Glut Sets 1 10 [] [] [] []    Hamstring Sets   [] [] [] []    Short Arc Quads   [] [] [] []    Heel Slides   [] [] [] []    Straight Leg Raises 1 10 [] [] [] []    Hip Abd/Add 1 10 [] [] [] []    Long Arc Quads   [] [] [] []    Seated Marching   [] [] [] []    Standing Marching   [] [] [] []    TA Draw 1 10 [] [] [] []        Vital Signs  Temp: 98.5 °F (36.9 °C)     Pulse (Heart Rate): 77     BP: 122/72     Resp Rate: 16     O2 Sat (%): 96 %  Pain:  Pre treatment pain level:5  Post treatment pain level:5  Pain Scale 1: Numeric (0 - 10)  Pain Intensity 1: 5  Pain Location 1: Foot     Pain Description 1: Aching  Pain Intervention(s) 1: Medication (see MAR)  Activity Tolerance:   Fair      After treatment:   Patient left in no apparent distress sitting up in chair    Call bell left within reach  Nursing notified      Irene Francisco PTA   Time Calculation: 28 mins

## 2018-06-17 NOTE — ROUTINE PROCESS
Bedside and Verbal shift change report given to Cristian Clements (oncoming nurse) by Raul Durham (offgoing nurse). Report included the following information SBAR, Kardex, MAR and Recent Results.

## 2018-06-17 NOTE — ROUTINE PROCESS
Bedside and Verbal shift change report given to Dashawn Arora RN (oncoming nurse) by Augustin Simeon RN  (offgoing nurse). Report included the following information SBAR, Kardex and MAR.

## 2018-06-18 LAB
ANION GAP SERPL CALC-SCNC: 9 MMOL/L (ref 3–18)
BASOPHILS # BLD: 0 K/UL (ref 0–0.06)
BASOPHILS NFR BLD: 0 % (ref 0–2)
BUN SERPL-MCNC: 16 MG/DL (ref 7–18)
BUN/CREAT SERPL: 14 (ref 12–20)
CALCIUM SERPL-MCNC: 8.1 MG/DL (ref 8.5–10.1)
CHLORIDE SERPL-SCNC: 96 MMOL/L (ref 100–108)
CO2 SERPL-SCNC: 32 MMOL/L (ref 21–32)
CREAT SERPL-MCNC: 1.11 MG/DL (ref 0.6–1.3)
DIFFERENTIAL METHOD BLD: ABNORMAL
EOSINOPHIL # BLD: 0.4 K/UL (ref 0–0.4)
EOSINOPHIL NFR BLD: 4 % (ref 0–5)
ERYTHROCYTE [DISTWIDTH] IN BLOOD BY AUTOMATED COUNT: 12.4 % (ref 11.6–14.5)
GLUCOSE SERPL-MCNC: 92 MG/DL (ref 74–99)
HCT VFR BLD AUTO: 31.7 % (ref 35–45)
HGB BLD-MCNC: 10.1 G/DL (ref 12–16)
LYMPHOCYTES # BLD: 1.2 K/UL (ref 0.9–3.6)
LYMPHOCYTES NFR BLD: 13 % (ref 21–52)
MCH RBC QN AUTO: 32 PG (ref 24–34)
MCHC RBC AUTO-ENTMCNC: 31.9 G/DL (ref 31–37)
MCV RBC AUTO: 100.3 FL (ref 74–97)
MONOCYTES # BLD: 0.9 K/UL (ref 0.05–1.2)
MONOCYTES NFR BLD: 10 % (ref 3–10)
NEUTS SEG # BLD: 6.5 K/UL (ref 1.8–8)
NEUTS SEG NFR BLD: 73 % (ref 40–73)
PLATELET # BLD AUTO: 181 K/UL (ref 135–420)
PMV BLD AUTO: 12.7 FL (ref 9.2–11.8)
POTASSIUM SERPL-SCNC: 2.9 MMOL/L (ref 3.5–5.5)
RBC # BLD AUTO: 3.16 M/UL (ref 4.2–5.3)
SODIUM SERPL-SCNC: 137 MMOL/L (ref 136–145)
WBC # BLD AUTO: 8.9 K/UL (ref 4.6–13.2)

## 2018-06-18 PROCEDURE — 74011000250 HC RX REV CODE- 250: Performed by: HOSPITALIST

## 2018-06-18 PROCEDURE — 94640 AIRWAY INHALATION TREATMENT: CPT

## 2018-06-18 PROCEDURE — 74011250637 HC RX REV CODE- 250/637: Performed by: ORTHOPAEDIC SURGERY

## 2018-06-18 PROCEDURE — 80048 BASIC METABOLIC PNL TOTAL CA: CPT | Performed by: HOSPITALIST

## 2018-06-18 PROCEDURE — 74011250636 HC RX REV CODE- 250/636: Performed by: ORTHOPAEDIC SURGERY

## 2018-06-18 PROCEDURE — 74011250637 HC RX REV CODE- 250/637: Performed by: HOSPITALIST

## 2018-06-18 PROCEDURE — 97530 THERAPEUTIC ACTIVITIES: CPT

## 2018-06-18 PROCEDURE — 36415 COLL VENOUS BLD VENIPUNCTURE: CPT | Performed by: HOSPITALIST

## 2018-06-18 PROCEDURE — 85025 COMPLETE CBC W/AUTO DIFF WBC: CPT | Performed by: HOSPITALIST

## 2018-06-18 PROCEDURE — 65270000029 HC RM PRIVATE

## 2018-06-18 RX ORDER — ENOXAPARIN SODIUM 100 MG/ML
40 INJECTION SUBCUTANEOUS EVERY 24 HOURS
Qty: 21 SYRINGE | Refills: 0 | Status: SHIPPED | OUTPATIENT
Start: 2018-06-19 | End: 2019-06-10

## 2018-06-18 RX ORDER — ALBUTEROL SULFATE 0.83 MG/ML
2.5 SOLUTION RESPIRATORY (INHALATION)
Status: DISCONTINUED | OUTPATIENT
Start: 2018-06-18 | End: 2018-06-19 | Stop reason: HOSPADM

## 2018-06-18 RX ORDER — HYDROMORPHONE HYDROCHLORIDE 2 MG/1
2-4 TABLET ORAL
Qty: 60 TAB | Refills: 0 | Status: SHIPPED | OUTPATIENT
Start: 2018-06-18 | End: 2019-06-10

## 2018-06-18 RX ORDER — POTASSIUM CHLORIDE 20 MEQ/1
40 TABLET, EXTENDED RELEASE ORAL
Status: DISCONTINUED | OUTPATIENT
Start: 2018-06-18 | End: 2018-06-18

## 2018-06-18 RX ORDER — POTASSIUM CHLORIDE 20 MEQ/1
40 TABLET, EXTENDED RELEASE ORAL 3 TIMES DAILY
Status: DISCONTINUED | OUTPATIENT
Start: 2018-06-18 | End: 2018-06-19 | Stop reason: HOSPADM

## 2018-06-18 RX ADMIN — BUDESONIDE 500 MCG: 0.5 INHALANT RESPIRATORY (INHALATION) at 20:53

## 2018-06-18 RX ADMIN — Medication 10 ML: at 06:06

## 2018-06-18 RX ADMIN — TRAMADOL HYDROCHLORIDE 50 MG: 50 TABLET, FILM COATED ORAL at 12:04

## 2018-06-18 RX ADMIN — BUDESONIDE 500 MCG: 0.5 INHALANT RESPIRATORY (INHALATION) at 08:38

## 2018-06-18 RX ADMIN — Medication 10 ML: at 21:24

## 2018-06-18 RX ADMIN — FUROSEMIDE 20 MG: 20 TABLET ORAL at 09:14

## 2018-06-18 RX ADMIN — ACETAMINOPHEN 500 MG: 500 TABLET, FILM COATED ORAL at 06:06

## 2018-06-18 RX ADMIN — TRAMADOL HYDROCHLORIDE 50 MG: 50 TABLET, FILM COATED ORAL at 17:41

## 2018-06-18 RX ADMIN — TRAMADOL HYDROCHLORIDE 50 MG: 50 TABLET, FILM COATED ORAL at 06:06

## 2018-06-18 RX ADMIN — TRAMADOL HYDROCHLORIDE 50 MG: 50 TABLET, FILM COATED ORAL at 23:32

## 2018-06-18 RX ADMIN — ACETAMINOPHEN 500 MG: 500 TABLET, FILM COATED ORAL at 23:32

## 2018-06-18 RX ADMIN — POTASSIUM CHLORIDE 40 MEQ: 20 TABLET, EXTENDED RELEASE ORAL at 21:23

## 2018-06-18 RX ADMIN — Medication 10 ML: at 17:41

## 2018-06-18 RX ADMIN — ENOXAPARIN SODIUM 40 MG: 100 INJECTION SUBCUTANEOUS at 06:05

## 2018-06-18 RX ADMIN — ALBUTEROL SULFATE 2.5 MG: 2.5 SOLUTION RESPIRATORY (INHALATION) at 20:53

## 2018-06-18 RX ADMIN — ALBUTEROL SULFATE 2.5 MG: 2.5 SOLUTION RESPIRATORY (INHALATION) at 08:38

## 2018-06-18 RX ADMIN — ARFORMOTEROL TARTRATE 15 MCG: 15 SOLUTION RESPIRATORY (INHALATION) at 08:38

## 2018-06-18 RX ADMIN — ACETAMINOPHEN 500 MG: 500 TABLET, FILM COATED ORAL at 17:41

## 2018-06-18 RX ADMIN — POTASSIUM CHLORIDE 40 MEQ: 20 TABLET, EXTENDED RELEASE ORAL at 09:00

## 2018-06-18 RX ADMIN — ALBUTEROL SULFATE 2.5 MG: 2.5 SOLUTION RESPIRATORY (INHALATION) at 13:53

## 2018-06-18 RX ADMIN — ROSUVASTATIN CALCIUM 10 MG: 10 TABLET, FILM COATED ORAL at 21:23

## 2018-06-18 RX ADMIN — ACETAMINOPHEN 500 MG: 500 TABLET, FILM COATED ORAL at 12:04

## 2018-06-18 RX ADMIN — ARFORMOTEROL TARTRATE 15 MCG: 15 SOLUTION RESPIRATORY (INHALATION) at 20:53

## 2018-06-18 RX ADMIN — POTASSIUM CHLORIDE 40 MEQ: 20 TABLET, EXTENDED RELEASE ORAL at 16:26

## 2018-06-18 NOTE — CDMP QUERY
Please clarify if this patient is being treated for     =>Copd Exac  requiring Nebs q 6 hours and 02 to maintain Sats >91% at all times   =>Other Explanation of clinical findings  =>Unable to Determine (no explanation of clinical findings)    The medical record reflects the following:    Risk: Surg, hx of Copd without home 02 , Symbicort 2 puffs bid     Clinical Indicators:  Pt with Shortness of breath, 02 sats <88%, pleura effusion and atelectasis noted on Cxr     Treatment:  02 to maintain sats >91%, nebs q 6 hours, ics, Abg's, Cxr,     Please clarify and document your clinical opinion in the progress notes and discharge summary including the definitive and/or presumptive diagnosis, (suspected or probable), related to the above clinical findings. Please include clinical findings supporting your diagnosis. If you DECLINE this query or would like to communicate with [facility], please utilize the \"[employer] message box\" at the TOP of the Progress Note on the right.       Thank you,  Jessie Dozier RN/CCDS   [phone]

## 2018-06-18 NOTE — PROGRESS NOTES
Problem: Falls - Risk of  Goal: *Absence of Falls  Document Noel Fall Risk and appropriate interventions in the flowsheet. Outcome: Progressing Towards Goal  Fall Risk Interventions:  Mobility Interventions: Communicate number of staff needed for ambulation/transfer, OT consult for ADLs, Patient to call before getting OOB, PT Consult for mobility concerns, Strengthening exercises (ROM-active/passive), Utilize walker, cane, or other assitive device         Medication Interventions: Assess postural VS orthostatic hypotension, Evaluate medications/consider consulting pharmacy, Patient to call before getting OOB, Teach patient to arise slowly    Elimination Interventions: Call light in reach, Patient to call for help with toileting needs, Toileting schedule/hourly rounds, Toilet paper/wipes in reach    History of Falls Interventions: Consult care management for discharge planning, Door open when patient unattended, Evaluate medications/consider consulting pharmacy, Room close to nurse's station        Problem: Pressure Injury - Risk of  Goal: *Prevention of pressure injury  Document Eriberto Scale and appropriate interventions in the flowsheet. Outcome: Progressing Towards Goal  Pressure Injury Interventions:       Moisture Interventions: Check for incontinence Q2 hours and as needed, Maintain skin hydration (lotion/cream), Offer toileting Q_hr    Activity Interventions: Pressure redistribution bed/mattress(bed type), Increase time out of bed, PT/OT evaluation    Mobility Interventions: HOB 30 degrees or less, Pressure redistribution bed/mattress (bed type), PT/OT evaluation, Turn and reposition approx.  every two hours(pillow and wedges)    Nutrition Interventions: Document food/fluid/supplement intake

## 2018-06-18 NOTE — PROGRESS NOTES
Spoke with patient and  in room, patient and  have changed their mind about SNF and [facility] [address] and [facility]. Earlier int he day patient and  stated that would go home with home health  If [facility] could not accept , but now have change their mind.

## 2018-06-18 NOTE — PROGRESS NOTES
Recd pt on 2 lpm NC - pt awake & alert sitting upright in chair  -administer nebs - substitute albuterol (PRN) for DuoNeb Q6 (pt had reported sensitivity in past to [place])

## 2018-06-18 NOTE — PROGRESS NOTES
Progress Note      Post Operative Day: 3    Assessment:    1. Status post  FUSION FOOT Tavonaman Garber [[id number]] (SUBTALAR JOINT FUSION) for m19.071  Ankle arthritis  Ankle arthritis 6/15/2018,   Progressing. PLAN:    1. Mobilize. Continue P.T.  2. 30lbs foot flat weight bearing   3. Lovenox for DVT prophylaxis   4. Discharge Planning to rehab when cleared medically with pulmonary issues           HPI: Bela Vidal is a 67 y.o. female patient without new complaints for m19.071  Ankle arthritis  Ankle arthritis 6/15/2018. No new orthopaedic changes. Blood pressure 147/76, pulse 68, temperature 97.6 °F (36.4 °C), resp. rate 16, height 5' 4\" (1.626 m), weight 66.2 kg (146 lb), SpO2 100 %. CBC w/Diff   Lab Results   Component Value Date/Time    WBC 8.9 06/18/2018 04:00 AM    RBC 3.16 (L) 06/18/2018 04:00 AM    HCT 31.7 (L) 06/18/2018 04:00 AM          Physical Assessment:  General: in no apparent distress   Extremities:  Neurovascular intact    Dressing:  Dry   DVT Exam:   No exam evidence to suggest DVT.  Compartments soft and NT.               - Samantha Adrian PA-C  6/18/2018  Office [phone] [phone] [phone]

## 2018-06-18 NOTE — PROGRESS NOTES
Problem: Falls - Risk of  Goal: *Absence of Falls  Document Noel Fall Risk and appropriate interventions in the flowsheet. Outcome: Progressing Towards Goal  Fall Risk Interventions:  Mobility Interventions: Patient to call before getting OOB         Medication Interventions: Evaluate medications/consider consulting pharmacy, Patient to call before getting OOB, Teach patient to arise slowly    Elimination Interventions: Call light in reach, Patient to call for help with toileting needs, Toilet paper/wipes in reach    History of Falls Interventions: Door open when patient unattended        Problem: Pressure Injury - Risk of  Goal: *Prevention of pressure injury  Document Eriberto Scale and appropriate interventions in the flowsheet.    Outcome: Progressing Towards Goal  Pressure Injury Interventions:       Moisture Interventions: Absorbent underpads, Apply protective barrier, creams and emollients    Activity Interventions: Increase time out of bed, Pressure redistribution bed/mattress(bed type), PT/OT evaluation    Mobility Interventions: HOB 30 degrees or less, Pressure redistribution bed/mattress (bed type), PT/OT evaluation    Nutrition Interventions: Document food/fluid/supplement intake

## 2018-06-18 NOTE — ROUTINE PROCESS
Bedside and Verbal shift change report given to Joshua Rojas RN (oncoming nurse) by Delicia Thornton RN  (offgoing nurse). Report included the following information SBAR, Kardex and MAR.

## 2018-06-18 NOTE — PROGRESS NOTES
Progress Note      Patient: Bela Vidal               Sex: female          DOA: 6/15/2018       YOB: 1945      Age:  67 y.o.        LOS:  LOS: 2 days             CHIEF COMPLAINT:    Subjective:     She feels better today and denies CP or SOB.  and nurse were at bedside. Objective:      Visit Vitals    /68 (BP 1 Location: Right arm, BP Patient Position: At rest)    Pulse 76    Temp 97.7 °F (36.5 °C)    Resp 12    Ht 5' 4\" (1.626 m)    Wt 146 lb (66.2 kg)    SpO2 90%    BMI 25.06 kg/m2       Physical Exam:  GEN: AAO X 3, NAD  CVS: Normal S1S2, RRR  RESP: Moderate AE bilaterally  ABD: Soft, NT/ND, +BS  EXT: RLE in cast and no edema noted on LLE  NEURO: CN 2 - 12 intact with no focal deficits noted.         Lab/Data Reviewed:  CMP:   Lab Results   Component Value Date/Time     06/18/2018 04:00 AM    K 2.9 (LL) 06/18/2018 04:00 AM    CL 96 (L) 06/18/2018 04:00 AM    CO2 32 06/18/2018 04:00 AM    AGAP 9 06/18/2018 04:00 AM    GLU 92 06/18/2018 04:00 AM    BUN 16 06/18/2018 04:00 AM    CREA 1.11 06/18/2018 04:00 AM    GFRAA 58 (L) 06/18/2018 04:00 AM    GFRNA 48 (L) 06/18/2018 04:00 AM    CA 8.1 (L) 06/18/2018 04:00 AM     CBC:   Lab Results   Component Value Date/Time    WBC 8.9 06/18/2018 04:00 AM    HGB 10.1 (L) 06/18/2018 04:00 AM    HCT 31.7 (L) 06/18/2018 04:00 AM     06/18/2018 04:00 AM           Assessment/Plan     Principal Problem:    Chronic pain of right ankle (6/15/2018)    Active Problems:    Hypertension (4/4/2016)      Hypercholesterolemia (4/4/2016)      COPD (chronic obstructive pulmonary disease) ([facility] [address] (4/4/2016)      Ankle arthritis (6/15/2018)        Plan:  End-stage right ankle arthritis with severe varus deformity status post right tibiotalocalcaneal fusion. Continue analgesics as needed and diet advancement per Ortho.  Stressed to pt the importance of using incentive spirometry when awake especially as she is s/p recent surgery and Atelectasis noted on chest imaging and she verbalized understanding. Continue daily PT/OT as tolerated. Ortho following and appreciate help. Hypertension. Controlled and currently not taking BP meds. Continue close BP monitoring. COPD. Continue Duonebs Q 6, Brovana, and Pulmicort and supplemental oxygenation titrated to maintain sats greater than 91% at all times. Acute hypoxemic respiratory failure secondary to COPD. Oxygen saturation checked earlier without oxygen was low at between 87-88% per nurse and thus pt needs home oxygen. Discussed with nurse to call and notify  to make arrangements for 2 liters continuous home oxygen to be used at all times post discharge. New small left pleural effusion noted on chest imaging today. Continue Lasix 20 mg daily started yesterday. DVT prophylaxis. Lovenox  Hypokalemia. K today low at 2.9. Repleted earlier and will give another 40 meq KCL now - discussed with nurse. Disposition: Pt is stable medically and okay for discharge.   Discussed with nurse.  Germán Quigley DO, MPH  Internal Medicine

## 2018-06-18 NOTE — ANCILLARY DISCHARGE INSTRUCTIONS
Patient and/or next of kin has been given the [facility] Important Message From Medicare About Your Rights\" letter and all questions were answered.

## 2018-06-18 NOTE — PROGRESS NOTES
Problem: Mobility Impaired (Adult and Pediatric)  Goal: *Acute Goals and Plan of Care (Insert Text)  PHYSICAL THERAPY SHORT TERM GOALS :   1.  Patient will perform/complete all bed mobility with modified independence within 1 week(s). 2.  Patient will perform/complete sit <> stand  with modified independence within 1 week(s). 3.  Patient will ambulate 300 ft with RW with modified independence within 1 week(s). 4.  Patient ascend/descend 3 stairs with HR with modified independence within 1 week(s). Therapist Sheldon Vela  6/16/2018   Time Calculation: 25 mins   Outcome: Progressing Towards Goal    PHYSICAL THERAPY: Daily TREATMENT Note   INPATIENT: Medicare: Hospital Day: 4     Patient: Niko Regan (30 y.o. female)    Date: 6/18/2018  Primary Diagnosis: m19.071  Ankle arthritis  Ankle arthritis   Procedure(s) (LRB):  right tibiotalocalcaneal fusion with pop/sap (Right), 3 Days Post-Op,   Precautions: PWB, Fall (30LBs PWB on R)  PWB    Chart, physical therapy assessment, plan of care and goals were reviewed. PLOF:    ASSESSMENT:  Pt continues with some confusion and requiring verbal and tactile cues for task completion. Pt assessed on room air; sats 92% initially, instructed in standing and stepping exercises at chair with verbal cues for weight shift and compliance with weight bearing restriction; sats at 92-94% for 3 min with verbal cues for breathing techniques. At one point O2 did decrease to 88% when pt was holding breath, but quickly increased back to 92% or greater with cues. Pt stood 7 min for assessment. Pt and spouse instructed in verbal and visual techniques to improve exhalation, with instruction pt O2 increased to 97%. Pt left on room air and nurse David Berger notified. Progression toward goals:        Improving appropriately and progressing toward goals           PLAN:  Patient continues to benefit from skilled intervention to address the above impairments.   Continue treatment per established plan of care. EDUCATION:   Education:  Patient was educated on the following topics: breathing techniques   Barriers to Learning/Limitations: yes;  altered mental status (i.e.Sedation, Confusion)  Compensate with: visual, verbal, tactile, kinesthetic cues/model    Discharge Recommendations:  Vidal Perez  Further Equipment Recommendations for Discharge:  N/A  Factors which may impact discharge planning: pt and spouse desire discharge to home     SUBJECTIVE:   Patient stated my [birth year] was yesterday.     OBJECTIVE DATA SUMMARY:   Critical Behavior:  Neurologic State: Alert  Orientation Level: Oriented X4          G CODE:Mobility [id number] Current  CL= 60-79%   Goal  CJ= 20-39%. The severity rating is based on the [facility] Standing Balance Scale  0: Pt performs 25% or less of standing activity (Max assist) CN, 100% impaired. 1: Pt supports self with upper extremities but requires therapist assistance. Pt performs 25-50% of effort (Mod assist) CM, 80% to <100% impaired. 1+: Pt supports self with upper extremities but requires therapist assistance. Pt performs >50% effort. (Min assist). CL, 60% to <80% impaired. 2: Pt supports self independently with both upper extremities (walker, crutches, parallel bars). CL, 60% to <80% impaired. 2+: Pt support self independently with 1 upper extremity (cane, crutch, 1 parallel bar). CK, 40% to <60% impaired. 3: Pt stands without upper extremity support for up to 30 seconds. CK, 40% to <60% impaired. 3+: Pt stands without upper extremity support for 30 seconds or greater. CJ, 20% to <40% impaired. 4: Pt independently moves and returns center of gravity 1-2 inches in one plane. CJ, 20% to <40% impaired. 4+: Pt independently moves and returns center of gravity 1-2 inches in multiple planes. CI, 1% to <20% impaired. 5: Pt independently moves and returns center of gravity in all planes greater than 2 inches.  CH, 0% impaired.       Functional Mobility:      Functional Status      Indep   (I)   Mod I   Super-vision   Min A   Mod A   Max A   Total A   Assist x2 Verbal cues Additional time Not tested   Comments   Rolling []  []  [] []    []    []  []  [] [] [] []    Supine to sit []  []  [] []  []  []  []  [] [] [] []    Sit to supine []  []  [] []  []  []  []  [] [] [] []    Sit to stand []  []  [] [x]  []  []  []  [] [] [] []    Stand to sit []  []  [] [x]  []  []  []  [] [] [] []    Bed to chair transfers []  []  [] []  []  []  []  [] [] [] []        Balance    Good   Fair   Poor   Unable   Not tested   Comments   Sitting static [x]  []  []  []  []    Sitting dynamic [x]  []  []  []  []    Standing static []  [x]  []  []  []    Standing dynamic []  [x]  []  []  []              Vital Signs  Temp: 97.7 °F (36.5 °C)     Pulse (Heart Rate): 76     BP: 108/68     Resp Rate: 12     O2 Sat (%): 90 %  Pain:  Pre treatment pain level:0  Post treatment pain level:0  Pain Scale 1: Numeric (0 - 10)  Pain Intensity 1: 4  Pain Location 1: Foot  Pain Orientation 1: Right  Pain Description 1: Aching  Pain Intervention(s) 1: Medication (see MAR)  Activity Tolerance:   Fair      After treatment:   Patient left in no apparent distress sitting up in chair    Call bell left within reach  Nursing notified  Caregiver present      Magdiel Kenny PTA   Time Calculation: 27 mins

## 2018-06-18 NOTE — DISCHARGE SUMMARY
Discharge Summary    Admit Date: 6/15/2018  Discharge Date:  18    Patient ID:   Name:  Bela Vidal      Age:  67 y.o.    :  1945    Admitting Diagnosis: m19.071  Ankle arthritis  Ankle arthritis     Post Operative Day: 4    Operative Procedures:  FUSION FOOT BONES,PANTALAR [95674] (SUBTALAR JOINT FUSION)      Isolation Precautions:   Not required. Patient is not currently contagious. Physical Exam on Discharge:  Visit Vitals    /68 (BP 1 Location: Right arm, BP Patient Position: At rest)    Pulse 76    Temp 97.7 °F (36.5 °C)    Resp 12    Ht 5' 4\" (1.626 m)    Wt 66.2 kg (146 lb)    SpO2 100%    BMI 25.06 kg/m2         General: in no apparent distress   Extremities:  Neurovascular intact   Dressing:  dry   DVT Exam:   No evidence of DVT seen on physical exam;  compartments soft and NT. Relevant labs within last 72 hours:    CBC w/Diff    Lab Results   Component Value Date/Time    WBC 8.9 2018 04:00 AM    RBC 3.16 (L) 2018 04:00 AM    HCT 31.7 (L) 2018 04:00 AM    .3 (H) 2018 04:00 AM    MCH 32.0 2018 04:00 AM    MCHC 31.9 2018 04:00 AM    RDW 12.4 2018 04:00 AM    Lab Results   Component Value Date/Time    MONOS 10 2018 04:00 AM    EOS 4 2018 04:00 AM    BASOS 0 2018 04:00 AM    RDW 12.4 2018 04:00 AM          BMP   Lab Results   Component Value Date     2018    CO2 32 2018    BUN 16 2018          Coagulation   Lab Results   Component Value Date    INR 0.9 2016    APTT 25.3 2016              Condition at discharge: Afebrile  Ambulating  Eating, Drinking, Voiding  Stable    Current Discharge Medication List      START taking these medications    Details   enoxaparin (LOVENOX) 40 mg/0.4 mL 0.4 mL by SubCUTAneous route every twenty-four (24) hours.   Qty: 21 Syringe, Refills: 0      HYDROmorphone (DILAUDID) 2 mg tablet Take 1-2 Tabs by mouth every four (4) hours as needed. Max Daily Amount: 24 mg. Qty: 60 Tab, Refills: 0    Associated Diagnoses: Type I or II open trimalleolar fracture, unspecified laterality, initial encounter         CONTINUE these medications which have NOT CHANGED    Details   SYMBICORT 80-4.5 mcg/actuation HFAA Take 2 Puffs by inhalation two (2) times a day. Refills: 3      calcium-cholecalciferol, d3, (CALCIUM 600 + D) 600-125 mg-unit tab Take 0.5 Tabs by mouth daily. losartan (COZAAR) 50 mg tablet Take 50 mg by mouth daily. aspirin 81 mg chewable tablet Take 1 Tab by mouth daily. Qty: 100 Tab, Refills: 0      conjugated estrogens (PREMARIN) 0.9 mg tablet Take 1 Tab by mouth daily. Qty: 60 Tab, Refills: 0      rosuvastatin (CRESTOR) 10 mg tablet Take 1 Tab by mouth daily. Qty: 60 Tab, Refills: 0      acetaminophen (TYLENOL) 650 mg CR tablet Take 1 Tab by mouth every eight (8) hours. Qty: 100 Tab, Refills: 0      diphenoxylate-atropine (LOMOTIL) 2.5-0.025 mg per tablet Take  by mouth four (4) times daily as needed. diazePAM (VALIUM) 5 mg tablet Take 5 mg by mouth daily. ergocalciferol (ERGOCALCIFEROL) 50,000 unit capsule Take 1 Cap by mouth every seven (7) days. Qty: 20 Cap, Refills: 0      senna (SENOKOT) 8.6 mg tablet Take 1 Tab by mouth two (2) times a day. Hold for loose Bowel Movement  Qty: 100 Tab, Refills: 0      triamterene-hydrochlorothiazide (MAXZIDE) 37.5-25 mg per tablet Take 0.5 Tabs by mouth daily. Qty: 100 Tab, Refills: 0               PCP:  Joselyn Watson MD        Disposition:  Clear for discharge to home with home oxygen, if clear by medicine service. Follow-up in the office in 2 weeks  with Dr. Tanner Robb; call [phone] to schedule appointment. Wound Care: leave splint in place and dry    DVT prophylaxis:  Lovenox per ortho for 21 days.     Weightbearing Status: 30lbs foot down weightbearing         -Samantha Adrian PA-C  6/18/2018  Office [phone]  Cell [phone]

## 2018-06-18 NOTE — INTERDISCIPLINARY ROUNDS
IDR Summary      Patient: Harry Ospina MRN: [id number]    Age: 67 y.o.  : 1945     Admit Diagnosis: m19.071  Ankle arthritis  Ankle arthritis      POD #: 3    DIET status: Regular     Lines/Tubes:   IV: YES   Needed: YES  [place]: NO  Needed:NO  Central Line: NO Needed: NO      VTE Prophylaxis: Chemical    Mobility needs: Yes     PT ordered:  YES PT eval on chart: YES    OT ordered:  YES OT eval on chart: YES      ST ordered:  NO ST eval on chart:  NO     Disposition/Care Management:  Discharge plan: Home with [address] ordered? YES     Recommended DME from PT/OT:      DME ordered? YES     SNF- has patient been matched? YES    Accepting bed? NO   Does patient require insurance auth?   NO      Barriers to discharge:   Financial concerns: Np   PCP: Sisi Holt MD    : NO  Interventions:       LOS: 2 days     Expected days until discharge: today or tomorrow          Signed:     RICHARD Vides-BC  [address]  Hospitalist Division  Pager:  [phone]  Office:  [phone]

## 2018-06-19 VITALS
TEMPERATURE: 97.9 F | SYSTOLIC BLOOD PRESSURE: 118 MMHG | WEIGHT: 146 LBS | BODY MASS INDEX: 24.92 KG/M2 | RESPIRATION RATE: 18 BRPM | DIASTOLIC BLOOD PRESSURE: 73 MMHG | HEART RATE: 78 BPM | HEIGHT: 64 IN | OXYGEN SATURATION: 96 %

## 2018-06-19 PROCEDURE — 74011000250 HC RX REV CODE- 250: Performed by: HOSPITALIST

## 2018-06-19 PROCEDURE — 74011250637 HC RX REV CODE- 250/637: Performed by: HOSPITALIST

## 2018-06-19 PROCEDURE — 74011250636 HC RX REV CODE- 250/636: Performed by: ORTHOPAEDIC SURGERY

## 2018-06-19 PROCEDURE — 74011250637 HC RX REV CODE- 250/637: Performed by: ORTHOPAEDIC SURGERY

## 2018-06-19 RX ADMIN — ACETAMINOPHEN 500 MG: 500 TABLET, FILM COATED ORAL at 05:09

## 2018-06-19 RX ADMIN — TRAMADOL HYDROCHLORIDE 50 MG: 50 TABLET, FILM COATED ORAL at 14:50

## 2018-06-19 RX ADMIN — Medication 10 ML: at 05:10

## 2018-06-19 RX ADMIN — TRAMADOL HYDROCHLORIDE 50 MG: 50 TABLET, FILM COATED ORAL at 05:09

## 2018-06-19 RX ADMIN — ALBUTEROL SULFATE 2.5 MG: 2.5 SOLUTION RESPIRATORY (INHALATION) at 02:58

## 2018-06-19 RX ADMIN — FUROSEMIDE 20 MG: 20 TABLET ORAL at 09:25

## 2018-06-19 RX ADMIN — ALBUTEROL SULFATE 2.5 MG: 2.5 SOLUTION RESPIRATORY (INHALATION) at 09:32

## 2018-06-19 RX ADMIN — ACETAMINOPHEN 500 MG: 500 TABLET, FILM COATED ORAL at 14:50

## 2018-06-19 RX ADMIN — BUDESONIDE 500 MCG: 0.5 INHALANT RESPIRATORY (INHALATION) at 09:33

## 2018-06-19 RX ADMIN — ARFORMOTEROL TARTRATE 15 MCG: 15 SOLUTION RESPIRATORY (INHALATION) at 09:33

## 2018-06-19 RX ADMIN — POTASSIUM CHLORIDE 40 MEQ: 20 TABLET, EXTENDED RELEASE ORAL at 09:26

## 2018-06-19 RX ADMIN — ENOXAPARIN SODIUM 40 MG: 100 INJECTION SUBCUTANEOUS at 07:56

## 2018-06-19 NOTE — PROGRESS NOTES
1932 Received pt sitting on the recliner chair at the bedside, stable voiced no acute distress. Call bell within reach will continue to monitor. 2356 Uneventful shift as of now, due meds tolerated and pt requested to stay on the recliner chair. Bedside and Verbal shift change report given to Riccardo Ryan RN report included the following information SBAR, Kardex, Intake/Output and MAR.

## 2018-06-19 NOTE — PROGRESS NOTES
Care Management Interventions  PCP Verified by CM: Yes (early this month)  Palliative Care Criteria Met (RRAT>21 & CHF Dx)?: No  Mode of Transport at Discharge: Other (see comment) ()  Transition of Care Consult (CM Consult): Discharge Planning  MyChart Signup: No  Discharge Durable Medical Equipment: No  Physical Therapy Consult: Yes  Occupational Therapy Consult: No  Speech Therapy Consult: No  Current Support Network: Lives with Spouse  Confirm Follow Up Transport: Family  Plan discussed with Pt/Family/Caregiver: Yes   Resource Information Provided?: No  Discharge Location  Discharge Placement: Skilled nursing facility [address] [place] [address] care to  patient at 3pm, Nurse to call report to [phone], PCS and packet at nurses station. H&P, discharge summary faxed to [facility] @ [phone].

## 2018-06-19 NOTE — PROGRESS NOTES
Transportation at Discharge: 6/19/2018    Transport Company: [employer])  Ph# [phone]    Reference number: None    Estimated pick-up time: 3:00P   Destination: [employer]    Method of Transport: Stretcher/ Bhaskar: Medicare / Marla Cabral:  No auth required    Made DC transportation arrangements at the request of Pal [address] [place] [address] Road [employer] ext [phone]

## 2018-06-19 NOTE — PERIOP NOTES
Rec'd care of pt sitting up in recliner chair. Denies pain or discomforts. Telemetry monitor #29 in place. Assessment completed. Will cont to monitor. 0500  Medicated with scheduled tylenol and ultram.  It is noted that pt coughs every time she drinks some water with taking the pills. Will endorse to on coming nurse. 0730  TRANSFER - OUT REPORT:    Verbal report given to Brenda Easley RN (name) on [facility].

## 2018-06-19 NOTE — PROGRESS NOTES
1300: Assisted pt from bedside commode to chair with walker. Pt tolerated well. Voided 300 cc clear, yellow urine. Denies further needs. Call bell within reach.

## 2018-06-19 NOTE — PROGRESS NOTES
I have reviewed discharge instructions with the patient. The patient verbalized understanding. Computer signature pad does not record.

## 2018-06-19 NOTE — PROGRESS NOTES
Patient has been accepted by [facility] RUBEN JOYA and patient and  are agreeable to discharge to this snf facility.

## 2018-06-19 NOTE — PROGRESS NOTES
1459:  Called Enmanuel Olivo to give report x2. Stated both times that nurse was unavailable to take report. Stated to \"just send patient with out report, it happens all the time. \"  Will make a third attempt before transport. 1545:  Patient moved via transport. 1556:  The nurse from [facility], [facility], called for report.

## 2019-12-03 ENCOUNTER — APPOINTMENT (OUTPATIENT)
Dept: CT IMAGING | Age: 74
End: 2019-12-03
Attending: EMERGENCY MEDICINE
Payer: MEDICARE

## 2019-12-03 ENCOUNTER — HOSPITAL ENCOUNTER (EMERGENCY)
Age: 74
Discharge: OTHER INSTITUTION | End: 2019-12-04
Attending: EMERGENCY MEDICINE | Admitting: EMERGENCY MEDICINE
Payer: MEDICARE

## 2019-12-03 ENCOUNTER — APPOINTMENT (OUTPATIENT)
Dept: GENERAL RADIOLOGY | Age: 74
End: 2019-12-03
Attending: EMERGENCY MEDICINE
Payer: MEDICARE

## 2019-12-03 DIAGNOSIS — R56.9 NEW ONSET SEIZURE (HCC): ICD-10-CM

## 2019-12-03 DIAGNOSIS — E87.6 ACUTE HYPOKALEMIA: ICD-10-CM

## 2019-12-03 DIAGNOSIS — I46.9 CARDIAC ARREST (HCC): Primary | ICD-10-CM

## 2019-12-03 LAB
ALBUMIN SERPL-MCNC: 3.4 G/DL (ref 3.4–5)
ALBUMIN/GLOB SERPL: 0.9 {RATIO} (ref 0.8–1.7)
ALP SERPL-CCNC: 54 U/L (ref 45–117)
ALT SERPL-CCNC: 27 U/L (ref 13–56)
AMPHET UR QL SCN: NEGATIVE
ANION GAP BLD CALC-SCNC: 21 MMOL/L (ref 10–20)
ANION GAP SERPL CALC-SCNC: 21 MMOL/L (ref 3–18)
APPEARANCE UR: ABNORMAL
ARTERIAL PATENCY WRIST A: ABNORMAL
AST SERPL-CCNC: 38 U/L (ref 10–38)
BACTERIA URNS QL MICRO: NEGATIVE /HPF
BARBITURATES UR QL SCN: NEGATIVE
BASE DEFICIT BLD-SCNC: 13 MMOL/L
BASOPHILS # BLD: 0.1 K/UL (ref 0–0.1)
BASOPHILS NFR BLD: 1 % (ref 0–2)
BDY SITE: ABNORMAL
BENZODIAZ UR QL: POSITIVE
BILIRUB SERPL-MCNC: 0.2 MG/DL (ref 0.2–1)
BILIRUB UR QL: NEGATIVE
BODY TEMPERATURE: 98.6
BUN BLD-MCNC: 25 MG/DL (ref 7–18)
BUN SERPL-MCNC: 25 MG/DL (ref 7–18)
BUN/CREAT SERPL: 15 (ref 12–20)
CA-I BLD-MCNC: 1.13 MMOL/L (ref 1.12–1.32)
CALCIUM SERPL-MCNC: 9.6 MG/DL (ref 8.5–10.1)
CANNABINOIDS UR QL SCN: NEGATIVE
CHLORIDE BLD-SCNC: 98 MMOL/L (ref 100–108)
CHLORIDE SERPL-SCNC: 96 MMOL/L (ref 100–111)
CK MB CFR SERPL CALC: 1.4 % (ref 0–4)
CK MB SERPL-MCNC: 3.7 NG/ML (ref 5–25)
CK SERPL-CCNC: 269 U/L (ref 26–192)
CO2 BLD-SCNC: 19 MMOL/L (ref 19–24)
CO2 SERPL-SCNC: 17 MMOL/L (ref 21–32)
COCAINE UR QL SCN: NEGATIVE
COLOR UR: YELLOW
CREAT SERPL-MCNC: 1.71 MG/DL (ref 0.6–1.3)
CREAT UR-MCNC: 1.7 MG/DL (ref 0.6–1.3)
DIFFERENTIAL METHOD BLD: ABNORMAL
EOSINOPHIL # BLD: 0.2 K/UL (ref 0–0.4)
EOSINOPHIL NFR BLD: 2 % (ref 0–5)
EPITH CASTS URNS QL MICRO: NORMAL /LPF (ref 0–5)
ERYTHROCYTE [DISTWIDTH] IN BLOOD BY AUTOMATED COUNT: 12.7 % (ref 11.6–14.5)
ETHANOL SERPL-MCNC: 43 MG/DL (ref 0–3)
GAS FLOW.O2 O2 DELIVERY SYS: ABNORMAL L/MIN
GAS FLOW.O2 SETTING OXYMISER: 14 BPM
GLOBULIN SER CALC-MCNC: 3.9 G/DL (ref 2–4)
GLUCOSE BLD STRIP.AUTO-MCNC: 226 MG/DL (ref 74–106)
GLUCOSE SERPL-MCNC: 228 MG/DL (ref 74–99)
GLUCOSE UR STRIP.AUTO-MCNC: 250 MG/DL
HCO3 BLD-SCNC: 15.4 MMOL/L (ref 22–26)
HCT VFR BLD AUTO: 39.6 % (ref 35–45)
HCT VFR BLD CALC: 40 % (ref 36–49)
HDSCOM,HDSCOM: ABNORMAL
HGB BLD-MCNC: 12.1 G/DL (ref 12–16)
HGB BLD-MCNC: 13.6 G/DL (ref 12–16)
HGB UR QL STRIP: ABNORMAL
INR PPP: 1.1 (ref 0.8–1.2)
INSPIRATION.DURATION SETTING TIME VENT: 1 SEC
KETONES UR QL STRIP.AUTO: NEGATIVE MG/DL
LACTATE BLD-SCNC: 13.63 MMOL/L (ref 0.4–2)
LEUKOCYTE ESTERASE UR QL STRIP.AUTO: NEGATIVE
LYMPHOCYTES # BLD: 3.8 K/UL (ref 0.9–3.6)
LYMPHOCYTES NFR BLD: 34 % (ref 21–52)
MCH RBC QN AUTO: 31.1 PG (ref 24–34)
MCHC RBC AUTO-ENTMCNC: 30.6 G/DL (ref 31–37)
MCV RBC AUTO: 101.8 FL (ref 74–97)
METHADONE UR QL: NEGATIVE
MONOCYTES # BLD: 0.7 K/UL (ref 0.05–1.2)
MONOCYTES NFR BLD: 6 % (ref 3–10)
NEUTS SEG # BLD: 6.3 K/UL (ref 1.8–8)
NEUTS SEG NFR BLD: 57 % (ref 40–73)
NITRITE UR QL STRIP.AUTO: NEGATIVE
O2/TOTAL GAS SETTING VFR VENT: 100 %
OPIATES UR QL: NEGATIVE
PCO2 BLD: 42.7 MMHG (ref 35–45)
PCP UR QL: NEGATIVE
PEEP RESPIRATORY: 5 CMH2O
PH BLD: 7.17 [PH] (ref 7.35–7.45)
PH UR STRIP: 7 [PH] (ref 5–8)
PIP ISTAT,IPIP: 25
PLATELET # BLD AUTO: 219 K/UL (ref 135–420)
PMV BLD AUTO: 12.6 FL (ref 9.2–11.8)
PO2 BLD: 209 MMHG (ref 80–100)
POTASSIUM BLD-SCNC: 2.7 MMOL/L (ref 3.5–5.5)
POTASSIUM SERPL-SCNC: 3 MMOL/L (ref 3.5–5.5)
PROT SERPL-MCNC: 7.3 G/DL (ref 6.4–8.2)
PROT UR STRIP-MCNC: 300 MG/DL
PROTHROMBIN TIME: 13.7 SEC (ref 11.5–15.2)
RBC # BLD AUTO: 3.89 M/UL (ref 4.2–5.3)
RBC #/AREA URNS HPF: NORMAL /HPF (ref 0–5)
SAO2 % BLD: 99 % (ref 92–97)
SERVICE CMNT-IMP: ABNORMAL
SODIUM BLD-SCNC: 134 MMOL/L (ref 136–145)
SODIUM SERPL-SCNC: 134 MMOL/L (ref 136–145)
SP GR UR REFRACTOMETRY: 1.02 (ref 1–1.03)
SPECIMEN TYPE: ABNORMAL
TOTAL RESP. RATE, ITRR: 14
TROPONIN I BLD-MCNC: <0.04 NG/ML (ref 0–0.08)
TROPONIN I SERPL-MCNC: <0.02 NG/ML (ref 0–0.04)
UROBILINOGEN UR QL STRIP.AUTO: 0.2 EU/DL (ref 0.2–1)
VENTILATION MODE VENT: ABNORMAL
VOLUME CONTROL PLUS IVLCP: YES
VT SETTING VENT: 450 ML
WBC # BLD AUTO: 11.1 K/UL (ref 4.6–13.2)
WBC URNS QL MICRO: NORMAL /HPF (ref 0–4)

## 2019-12-03 PROCEDURE — 75810000455 HC PLCMT CENT VENOUS CATH LVL 2 5182

## 2019-12-03 PROCEDURE — 70496 CT ANGIOGRAPHY HEAD: CPT

## 2019-12-03 PROCEDURE — 80047 BASIC METABLC PNL IONIZED CA: CPT

## 2019-12-03 PROCEDURE — 74011000258 HC RX REV CODE- 258: Performed by: EMERGENCY MEDICINE

## 2019-12-03 PROCEDURE — 84484 ASSAY OF TROPONIN QUANT: CPT

## 2019-12-03 PROCEDURE — 87086 URINE CULTURE/COLONY COUNT: CPT

## 2019-12-03 PROCEDURE — 96367 TX/PROPH/DG ADDL SEQ IV INF: CPT

## 2019-12-03 PROCEDURE — 96376 TX/PRO/DX INJ SAME DRUG ADON: CPT

## 2019-12-03 PROCEDURE — 77030022643 HC PAD DEFIB AD HRTSTRT PHL -B

## 2019-12-03 PROCEDURE — 83605 ASSAY OF LACTIC ACID: CPT

## 2019-12-03 PROCEDURE — 96361 HYDRATE IV INFUSION ADD-ON: CPT

## 2019-12-03 PROCEDURE — L0140 CERVICAL SEMI-RIGID ADJUSTAB: HCPCS

## 2019-12-03 PROCEDURE — 87040 BLOOD CULTURE FOR BACTERIA: CPT

## 2019-12-03 PROCEDURE — 80053 COMPREHEN METABOLIC PANEL: CPT

## 2019-12-03 PROCEDURE — 96365 THER/PROPH/DIAG IV INF INIT: CPT

## 2019-12-03 PROCEDURE — 77030020454 HC TU ET CUF HI/LO COVD -B

## 2019-12-03 PROCEDURE — 77030008477 HC STYL SATN SLP COVD -A

## 2019-12-03 PROCEDURE — 93005 ELECTROCARDIOGRAM TRACING: CPT

## 2019-12-03 PROCEDURE — 70450 CT HEAD/BRAIN W/O DYE: CPT

## 2019-12-03 PROCEDURE — 51702 INSERT TEMP BLADDER CATH: CPT

## 2019-12-03 PROCEDURE — 94762 N-INVAS EAR/PLS OXIMTRY CONT: CPT

## 2019-12-03 PROCEDURE — 74011000250 HC RX REV CODE- 250: Performed by: EMERGENCY MEDICINE

## 2019-12-03 PROCEDURE — 96375 TX/PRO/DX INJ NEW DRUG ADDON: CPT

## 2019-12-03 PROCEDURE — 74011250636 HC RX REV CODE- 250/636: Performed by: EMERGENCY MEDICINE

## 2019-12-03 PROCEDURE — 99285 EMERGENCY DEPT VISIT HI MDM: CPT

## 2019-12-03 PROCEDURE — 74011636320 HC RX REV CODE- 636/320: Performed by: EMERGENCY MEDICINE

## 2019-12-03 PROCEDURE — 36600 WITHDRAWAL OF ARTERIAL BLOOD: CPT

## 2019-12-03 PROCEDURE — 71045 X-RAY EXAM CHEST 1 VIEW: CPT

## 2019-12-03 PROCEDURE — 81001 URINALYSIS AUTO W/SCOPE: CPT

## 2019-12-03 PROCEDURE — 85610 PROTHROMBIN TIME: CPT

## 2019-12-03 PROCEDURE — 94002 VENT MGMT INPAT INIT DAY: CPT

## 2019-12-03 PROCEDURE — 85025 COMPLETE CBC W/AUTO DIFF WBC: CPT

## 2019-12-03 PROCEDURE — 82550 ASSAY OF CK (CPK): CPT

## 2019-12-03 PROCEDURE — 80307 DRUG TEST PRSMV CHEM ANLYZR: CPT

## 2019-12-03 PROCEDURE — 31500 INSERT EMERGENCY AIRWAY: CPT

## 2019-12-03 PROCEDURE — 82803 BLOOD GASES ANY COMBINATION: CPT

## 2019-12-03 RX ORDER — SODIUM CHLORIDE, SODIUM LACTATE, POTASSIUM CHLORIDE, CALCIUM CHLORIDE 600; 310; 30; 20 MG/100ML; MG/100ML; MG/100ML; MG/100ML
150 INJECTION, SOLUTION INTRAVENOUS CONTINUOUS
Status: DISCONTINUED | OUTPATIENT
Start: 2019-12-03 | End: 2019-12-04 | Stop reason: HOSPADM

## 2019-12-03 RX ORDER — FENTANYL CITRATE 50 UG/ML
100 INJECTION, SOLUTION INTRAMUSCULAR; INTRAVENOUS
Status: DISCONTINUED | OUTPATIENT
Start: 2019-12-03 | End: 2019-12-04 | Stop reason: HOSPADM

## 2019-12-03 RX ORDER — LEVOFLOXACIN 5 MG/ML
750 INJECTION, SOLUTION INTRAVENOUS EVERY 24 HOURS
Status: DISCONTINUED | OUTPATIENT
Start: 2019-12-03 | End: 2019-12-03

## 2019-12-03 RX ORDER — LORAZEPAM 2 MG/ML
0.5 INJECTION INTRAMUSCULAR
Status: DISCONTINUED | OUTPATIENT
Start: 2019-12-03 | End: 2019-12-03

## 2019-12-03 RX ORDER — NOREPINEPHRINE BIT/0.9 % NACL 8 MG/250ML
.5-16 INFUSION BOTTLE (ML) INTRAVENOUS
Status: DISCONTINUED | OUTPATIENT
Start: 2019-12-03 | End: 2019-12-04 | Stop reason: HOSPADM

## 2019-12-03 RX ORDER — LORAZEPAM 2 MG/ML
1 INJECTION INTRAMUSCULAR
Status: DISCONTINUED | OUTPATIENT
Start: 2019-12-03 | End: 2019-12-03

## 2019-12-03 RX ORDER — PROPOFOL 10 MG/ML
0-50 INJECTION, EMULSION INTRAVENOUS
Status: DISCONTINUED | OUTPATIENT
Start: 2019-12-03 | End: 2019-12-04 | Stop reason: HOSPADM

## 2019-12-03 RX ORDER — FENTANYL CITRATE 50 UG/ML
100 INJECTION, SOLUTION INTRAMUSCULAR; INTRAVENOUS
Status: COMPLETED | OUTPATIENT
Start: 2019-12-03 | End: 2019-12-03

## 2019-12-03 RX ORDER — SODIUM CHLORIDE AND POTASSIUM CHLORIDE .9; .15 G/100ML; G/100ML
SOLUTION INTRAVENOUS CONTINUOUS
Status: ACTIVE | OUTPATIENT
Start: 2019-12-03 | End: 2019-12-03

## 2019-12-03 RX ORDER — SODIUM CHLORIDE 9 MG/ML
35 INJECTION, SOLUTION INTRAVENOUS ONCE
Status: COMPLETED | OUTPATIENT
Start: 2019-12-03 | End: 2019-12-03

## 2019-12-03 RX ORDER — SODIUM CHLORIDE 0.9 % (FLUSH) 0.9 %
5-10 SYRINGE (ML) INJECTION AS NEEDED
Status: DISCONTINUED | OUTPATIENT
Start: 2019-12-03 | End: 2019-12-04 | Stop reason: HOSPADM

## 2019-12-03 RX ORDER — LEVOFLOXACIN 5 MG/ML
750 INJECTION, SOLUTION INTRAVENOUS
Status: DISCONTINUED | OUTPATIENT
Start: 2019-12-05 | End: 2019-12-04 | Stop reason: HOSPADM

## 2019-12-03 RX ORDER — LORAZEPAM 2 MG/ML
2 INJECTION INTRAMUSCULAR
Status: COMPLETED | OUTPATIENT
Start: 2019-12-03 | End: 2019-12-03

## 2019-12-03 RX ORDER — VANCOMYCIN 2 GRAM/500 ML IN 0.9 % SODIUM CHLORIDE INTRAVENOUS
2000 ONCE
Status: COMPLETED | OUTPATIENT
Start: 2019-12-03 | End: 2019-12-04

## 2019-12-03 RX ORDER — MAGNESIUM SULFATE HEPTAHYDRATE 40 MG/ML
2 INJECTION, SOLUTION INTRAVENOUS ONCE
Status: COMPLETED | OUTPATIENT
Start: 2019-12-03 | End: 2019-12-03

## 2019-12-03 RX ADMIN — SODIUM CHLORIDE 1000 ML: 900 INJECTION, SOLUTION INTRAVENOUS at 21:02

## 2019-12-03 RX ADMIN — VANCOMYCIN HYDROCHLORIDE 2000 MG: 10 INJECTION, POWDER, LYOPHILIZED, FOR SOLUTION INTRAVENOUS at 22:56

## 2019-12-03 RX ADMIN — IOPAMIDOL 80 ML: 755 INJECTION, SOLUTION INTRAVENOUS at 21:18

## 2019-12-03 RX ADMIN — SODIUM CHLORIDE 35 ML: 900 INJECTION, SOLUTION INTRAVENOUS at 21:18

## 2019-12-03 RX ADMIN — LEVETIRACETAM 2000 MG: 100 INJECTION INTRAVENOUS at 22:04

## 2019-12-03 RX ADMIN — LORAZEPAM 2 MG: 2 INJECTION, SOLUTION INTRAMUSCULAR; INTRAVENOUS at 21:35

## 2019-12-03 RX ADMIN — PIPERACILLIN AND TAZOBACTAM 4.5 G: 4; .5 INJECTION, POWDER, LYOPHILIZED, FOR SOLUTION INTRAVENOUS; PARENTERAL at 22:15

## 2019-12-03 RX ADMIN — PROPOFOL 25 MCG/KG/MIN: 10 INJECTION, EMULSION INTRAVENOUS at 20:04

## 2019-12-03 RX ADMIN — SODIUM CHLORIDE 1000 ML: 900 INJECTION, SOLUTION INTRAVENOUS at 20:04

## 2019-12-03 RX ADMIN — NOREPINEPHRINE BITARTRATE 10 MCG/MIN: 1 INJECTION INTRAVENOUS at 23:10

## 2019-12-03 RX ADMIN — NOREPINEPHRINE BITARTRATE 5 MCG/MIN: 1 INJECTION INTRAVENOUS at 22:09

## 2019-12-03 RX ADMIN — LORAZEPAM 2 MG: 2 INJECTION, SOLUTION INTRAMUSCULAR; INTRAVENOUS at 23:13

## 2019-12-03 RX ADMIN — LEVOFLOXACIN 750 MG: 750 INJECTION, SOLUTION INTRAVENOUS at 20:06

## 2019-12-03 RX ADMIN — FENTANYL CITRATE 100 MCG: 50 INJECTION, SOLUTION INTRAMUSCULAR; INTRAVENOUS at 20:10

## 2019-12-03 RX ADMIN — MAGNESIUM SULFATE HEPTAHYDRATE 2 G: 40 INJECTION, SOLUTION INTRAVENOUS at 22:06

## 2019-12-04 VITALS
RESPIRATION RATE: 16 BRPM | BODY MASS INDEX: 28.99 KG/M2 | OXYGEN SATURATION: 98 % | HEIGHT: 64 IN | HEART RATE: 71 BPM | DIASTOLIC BLOOD PRESSURE: 51 MMHG | TEMPERATURE: 35 F | WEIGHT: 169.8 LBS | SYSTOLIC BLOOD PRESSURE: 143 MMHG

## 2019-12-04 LAB
ATRIAL RATE: 87 BPM
CALCULATED P AXIS, ECG09: 65 DEGREES
CALCULATED R AXIS, ECG10: 68 DEGREES
CALCULATED T AXIS, ECG11: 37 DEGREES
DIAGNOSIS, 93000: NORMAL
LACTATE BLD-SCNC: 3.79 MMOL/L (ref 0.4–2)
P-R INTERVAL, ECG05: 174 MS
Q-T INTERVAL, ECG07: 364 MS
QRS DURATION, ECG06: 98 MS
QTC CALCULATION (BEZET), ECG08: 438 MS
VENTRICULAR RATE, ECG03: 87 BPM

## 2019-12-04 PROCEDURE — 83605 ASSAY OF LACTIC ACID: CPT

## 2019-12-04 NOTE — ED TRIAGE NOTES
Patient arrives via EMS with a c/c of cardiac arrest approx. 45 minutes ago. Per EMS, patient initially choked on her chicken dinner and proceeded to code. EMS achieved ROSC, patient showing sinus tach  OA, intubated and unresponsive to any stimuli without sedation in the field.

## 2019-12-04 NOTE — PROGRESS NOTES
2000,called to ED to put pt on Vent  2300,transport pt to CT  2040,ETT pulled back to 23 cm @ the lip  2045. transported back to CT for CTA  2120,ABG drawn results given to MD   0000,pt resting on vent with eyes closed family at bedside awaiting transport to [address] [place] [address].

## 2019-12-04 NOTE — ED NOTES
Called report to Km Bond, accepting RN at [facility]. No further questions.

## 2019-12-04 NOTE — ED PROVIDER NOTES
Keyla Metcalf is a 75 y.o. female who was brought in by EMS after sustaining a cardiac arrest or respiratory arrest secondary to food impaction. Per EMS patient was at dinner and became unresponsive. Upon EMS arrival patient was noted to be PEA and not breathing and was responsive. CPR was started and a large piece of turkey was removed from the posterior pharynx by 1 of the EMS personnel patient was intubated without difficulty without medications. Patient was ventilated without difficulty. Patient got return of spontaneous circulation with noted elevated blood pressure. Prior to this patient was given 1 mg of epinephrine and then had a short period of given an epinephrine drip which was stopped after her blood pressure came back up. Patient has not been responsive nor had any spontaneous ventilations prior to arrival.  Patient was otherwise in her normal state of health prior to this event. Patient has had no recent difficulty swallowing or eating food. The history is provided by the EMS personnel, medical records and the spouse. The history is limited by the condition of the patient. Past Medical History:   Diagnosis Date    Arm fracture, left     Chronic diarrhea     [facility]ch[facility] obstructive [facility]     Hypercholesterolemia     Hypertension     Mixed urge and stress incontinence     Proteinuria     Right ankle injury     Stroke [facility])        Past Surgical History:   Procedure Laterality Date    HX ANKLE FRACTURE TX Right     fused    HX HYSTERECTOMY      HX ORTHOPAEDIC  2014    ARM AND ELBOW SX. Left bam    HX ORTHOPAEDIC      rt.elbow          No family history on file.     Social History     Socioeconomic History    Marital status:      Spouse name: Not on file    Number of children: Not on file    Years of education: Not on file    Highest education level: Not on file   Occupational History    Not on file   Social Needs    Financial resource strain: Not on file    Food insecurity:     Worry: Not on file     Inability: Not on file    Transportation needs:     Medical: Not on file     Non-medical: Not on file   Tobacco Use    Smoking status: Former Smoker     Packs/day: 1.00     Years: 30.00     Pack years: 30.00     Last attempt to quit: 2008     Years since quittin.8    Smokeless tobacco: Never Used   Substance and Sexual Activity    Alcohol use: Yes     Comment: a little    Drug use: No    Sexual activity: Not on file   Lifestyle    Physical activity:     Days per week: Not on file     Minutes per session: Not on file    Stress: Not on file   Relationships    Social connections:     Talks on phone: Not on file     Gets together: Not on file     Attends Zoroastrian service: Not on file     Active member of club or organization: Not on file     Attends meetings of clubs or organizations: Not on file     Relationship status: Not on file    Intimate partner violence:     Fear of current or ex partner: Not on file     Emotionally abused: Not on file     Physically abused: Not on file     Forced sexual activity: Not on file   Other Topics Concern    Not on file   Social History Narrative    Not on file         ALLERGIES: Percocet [oxycodone-acetaminophen]; Codeine; Ibuprofen; Tiotropium bromide; Vicodin [hydrocodone-acetaminophen]; and Zocor [simvastatin]    Review of Systems   Unable to perform ROS: Intubated       Vitals:    19 2045 196 19   BP: 112/57  (!) 89/53 99/62   Pulse: 65  64 65   Resp:   14 14   Temp:  (!) 94.6 °F (34.8 °C) (!) 34.6 °F (1.4 °C) (!) 34.5 °F (1.4 °C)   SpO2: 100%  100% 99%   Weight:       Height:                Physical Exam  Vitals signs and nursing note reviewed. Constitutional:       General: She is in acute distress. Appearance: She is well-developed. She is ill-appearing and toxic-appearing. She is not diaphoretic. Interventions: She is intubated.    HENT: Head: Normocephalic and atraumatic. Right Ear: External ear normal.      Left Ear: External ear normal.      Nose: Nose normal.      Mouth/Throat:      Pharynx: Uvula midline. Comments: ET tube in place. Eyes:      General: No scleral icterus. Conjunctiva/sclera: Conjunctivae normal.   Neck:      Musculoskeletal: Neck supple. Cardiovascular:      Rate and Rhythm: Normal rate. Occasional extrasystoles are present. Heart sounds: Murmur present. Pulmonary:      Effort: Pulmonary effort is normal. She is intubated. Breath sounds: Decreased breath sounds present. Abdominal:      Palpations: Abdomen is soft. Tenderness: There is no tenderness. Musculoskeletal:      Right lower leg: No edema. Skin:     General: Skin is warm and dry. Capillary Refill: Capillary refill takes less than 2 seconds. Neurological:      GCS: GCS eye subscore is 1. GCS verbal subscore is 1. GCS motor subscore is 1. Gait: Gait normal.   Psychiatric:      Comments: Not able to assess as patient is unresponsive          MDM       Central Line  Date/Time: 12/3/2019 10:01 PM  Performed by: Faviola Winslow MD  Authorized by: Faviola Winslow MD     Consent:     Consent obtained:  Emergent situation    Alternatives discussed:  No treatment  Pre-procedure details:     Hand hygiene: Hand hygiene performed prior to insertion      Sterile barrier technique: All elements of maximal sterile technique followed      Skin preparation:  ChloraPrep    Skin preparation agent: Skin preparation agent completely dried prior to procedure    Sedation:     Sedation type:  Deep  Anesthesia (see MAR for exact dosages):      Anesthesia method:  Local infiltration    Local anesthetic:  Lidocaine 1% w/o epi  Procedure details:     Location:  R femoral    Patient position:  Flat    Procedural supplies:  Triple lumen    Catheter size:  7 Fr    Landmarks identified: yes      Ultrasound guidance: yes      Sterile ultrasound techniques: Sterile gel and sterile probe covers were used      Number of attempts:  1    Successful placement: yes    Post-procedure details:     Post-procedure:  Dressing applied    Assessment:  Blood return through all ports and free fluid flow    Patient tolerance of procedure:   Tolerated well, no immediate complications      Vitals:  Patient Vitals for the past 12 hrs:   Temp Pulse Resp BP SpO2   12/03/19 2115 (!) 34.5 °F (1.4 °C) 65 14 99/62 99 %   12/03/19 2108 (!) 34.6 °F (1.4 °C) 64 14 (!) 89/53 100 %   12/03/19 2056 (!) 94.6 °F (34.8 °C)       12/03/19 2045  65  112/57 100 %   12/03/19 2040  66  106/57 100 %   12/03/19 2020  73  121/74 100 %   12/03/19 2015  73  136/77 100 %   12/03/19 2011  77 15  100 %   12/03/19 2010  72  134/67 100 %   12/03/19 2005  79  144/67 100 %   12/03/19 2001  (!) 108  (!) 207/138 100 %   12/03/19 2000  88  200/84 100 %         Medications ordered:   Medications   lactated Ringers infusion (has no administration in time range)   propofol (DIPRIVAN) 10 mg/mL injection (5 mcg/kg/min × 77 kg IntraVENous Restarted 12/3/19 2129)   sodium chloride (NS) flush 5-10 mL (has no administration in time range)   piperacillin-tazobactam (ZOSYN) 4.5 g in 0.9% sodium chloride (MBP/ADV) 100 mL MBP (has no administration in time range)     Followed by   piperacillin-tazobactam (ZOSYN) 4.5 g in 0.9% sodium chloride (MBP/ADV) 100 mL MBP (has no administration in time range)   vancomycin (VANCOCIN) 2000 mg in  ml infusion (has no administration in time range)   levoFLOXacin (LEVAQUIN) 750 mg in D5W IVPB (has no administration in time range)   magnesium sulfate 2 g/50 ml IVPB (premix or compounded) (has no administration in time range)   VANCOMYCIN INFORMATION NOTE (has no administration in time range)   0.9% sodium chloride with KCl 20 mEq/L infusion (has no administration in time range)   levETIRAcetam (KEPPRA) 2,000 mg in 0.9% sodium chloride 250 mL IVPB (has no administration in time range)   sodium chloride 0.9 % bolus infusion 1,000 mL (1,000 mL IntraVENous New Bag 12/3/19 2102)   fentaNYL citrate (PF) injection 100 mcg (has no administration in time range)   NOREPINephrine (LEVOPHED) 8 mg in 0.9% NS 250ml infusion (has no administration in time range)   sodium chloride 0.9 % bolus infusion 1,000 mL (1,000 mL IntraVENous New Bag 12/3/19 2004)   fentaNYL citrate (PF) injection 100 mcg (100 mcg IntraVENous Given 12/3/19 2010)   iopamidol (ISOVUE-370) 76 % injection 80 mL (80 mL IntraVENous Given 12/3/19 2118)   0.9% sodium chloride infusion 35 mL (0 mL IntraVENous IV Completed 12/3/19 2119)   LORazepam (ATIVAN) injection 2 mg (2 mg IntraVENous Given 12/3/19 2135)         Lab findings:  Recent Results (from the past 12 hour(s))   CBC WITH AUTOMATED DIFF    Collection Time: 12/03/19  8:01 PM   Result Value Ref Range    WBC 11.1 4.6 - 13.2 K/uL    RBC 3.89 (L) 4.20 - 5.30 M/uL    HGB 12.1 12.0 - 16.0 g/dL    HCT 39.6 35.0 - 45.0 %    .8 (H) 74.0 - 97.0 FL    MCH 31.1 24.0 - 34.0 PG    MCHC 30.6 (L) 31.0 - 37.0 g/dL    RDW 12.7 11.6 - 14.5 %    PLATELET 743 929 - 237 K/uL    MPV 12.6 (H) 9.2 - 11.8 FL    NEUTROPHILS 57 40 - 73 %    LYMPHOCYTES 34 21 - 52 %    MONOCYTES 6 3 - 10 %    EOSINOPHILS 2 0 - 5 %    BASOPHILS 1 0 - 2 %    ABS. NEUTROPHILS 6.3 1.8 - 8.0 K/UL    ABS. LYMPHOCYTES 3.8 (H) 0.9 - 3.6 K/UL    ABS. MONOCYTES 0.7 0.05 - 1.2 K/UL    ABS. EOSINOPHILS 0.2 0.0 - 0.4 K/UL    ABS.  BASOPHILS 0.1 0.0 - 0.1 K/UL    DF AUTOMATED     PROTHROMBIN TIME + INR    Collection Time: 12/03/19  8:01 PM   Result Value Ref Range    Prothrombin time 13.7 11.5 - 15.2 sec    INR 1.1 0.8 - 1.2     METABOLIC PANEL, COMPREHENSIVE    Collection Time: 12/03/19  8:01 PM   Result Value Ref Range    Sodium 134 (L) 136 - 145 mmol/L    Potassium 3.0 (L) 3.5 - 5.5 mmol/L    Chloride 96 (L) 100 - 111 mmol/L    CO2 17 (L) 21 - 32 mmol/L    Anion gap 21 (H) 3.0 - 18 mmol/L    Glucose 228 (H) 74 - 99 mg/dL    BUN 25 (H) 7.0 - 18 MG/DL    Creatinine 1.71 (H) 0.6 - 1.3 MG/DL    BUN/Creatinine ratio 15 12 - 20      GFR est AA 35 (L) >60 ml/min/1.73m2    GFR est non-AA 29 (L) >60 ml/min/1.73m2    Calcium 9.6 8.5 - 10.1 MG/DL    Bilirubin, total 0.2 0.2 - 1.0 MG/DL    ALT (SGPT) 27 13 - 56 U/L    AST (SGOT) 38 10 - 38 U/L    Alk.  phosphatase 54 45 - 117 U/L    Protein, total 7.3 6.4 - 8.2 g/dL    Albumin 3.4 3.4 - 5.0 g/dL    Globulin 3.9 2.0 - 4.0 g/dL    A-G Ratio 0.9 0.8 - 1.7     ETHYL ALCOHOL    Collection Time: 12/03/19  8:01 PM   Result Value Ref Range    ALCOHOL(ETHYL),SERUM 43 (H) 0 - 3 MG/DL   CARDIAC PANEL,(CK, CKMB & TROPONIN)    Collection Time: 12/03/19  8:01 PM   Result Value Ref Range     (H) 26 - 192 U/L    CK - MB 3.7 (H) <3.6 ng/ml    CK-MB Index 1.4 0.0 - 4.0 %    Troponin-I, QT <0.02 0.0 - 0.045 NG/ML   POC TROPONIN-I    Collection Time: 12/03/19  8:02 PM   Result Value Ref Range    Troponin-I (POC) <0.04 0.00 - 0.08 ng/mL   POC LACTIC ACID    Collection Time: 12/03/19  8:03 PM   Result Value Ref Range    Lactic Acid (POC) 13.63 (HH) 0.40 - 2.00 mmol/L   EKG, 12 LEAD, INITIAL    Collection Time: 12/03/19  8:06 PM   Result Value Ref Range    Ventricular Rate 87 BPM    Atrial Rate 87 BPM    P-R Interval 174 ms    QRS Duration 98 ms    Q-T Interval 364 ms    QTC Calculation (Bezet) 438 ms    Calculated P Axis 65 degrees    Calculated R Axis 68 degrees    Calculated T Axis 37 degrees    Diagnosis       Sinus rhythm with premature atrial complexes  Otherwise normal ECG  When compared with ECG of 23-MAR-2016 08:28,  premature atrial complexes are now present  Questionable change in QRS duration  Minimal criteria for Anterior infarct are no longer present     POC CHEM8    Collection Time: 12/03/19  8:08 PM   Result Value Ref Range    CO2, POC 19 19 - 24 MMOL/L    Glucose,  (H) 74 - 106 MG/DL    BUN, POC 25 (H) 7 - 18 MG/DL    Creatinine, POC 1.7 (H) 0.6 - 1.3 MG/DL    GFRAA, POC 36 (L) >60 ml/min/1.73m2    GFRNA, POC 29 (L) >60 ml/min/1.73m2    Sodium,  (L) 136 - 145 MMOL/L    Potassium, POC 2.7 (LL) 3.5 - 5.5 MMOL/L    Calcium, ionized (POC) 1.13 1.12 - 1.32 mmol/L    Chloride, POC 98 (L) 100 - 108 MMOL/L    Anion gap, POC 21 (H) 10 - 20      Hematocrit, POC 40 36 - 49 %    Hemoglobin, POC 13.6 12 - 16 G/DL   URINALYSIS W/ RFLX MICROSCOPIC    Collection Time: 12/03/19  8:52 PM   Result Value Ref Range    Color YELLOW      Appearance CLOUDY      Specific gravity 1.016 1.005 - 1.030      pH (UA) 7.0 5.0 - 8.0      Protein 300 (A) NEG mg/dL    Glucose 250 (A) NEG mg/dL    Ketone NEGATIVE  NEG mg/dL    Bilirubin NEGATIVE  NEG      Blood MODERATE (A) NEG      Urobilinogen 0.2 0.2 - 1.0 EU/dL    Nitrites NEGATIVE  NEG      Leukocyte Esterase NEGATIVE  NEG     DRUG SCREEN, URINE    Collection Time: 12/03/19  8:52 PM   Result Value Ref Range    BENZODIAZEPINES POSITIVE (A) NEG      BARBITURATES NEGATIVE  NEG      THC (TH-CANNABINOL) NEGATIVE  NEG      OPIATES NEGATIVE  NEG      PCP(PHENCYCLIDINE) NEGATIVE  NEG      COCAINE NEGATIVE  NEG      AMPHETAMINES NEGATIVE  NEG      METHADONE NEGATIVE  NEG      HDSCOM (NOTE)    URINE MICROSCOPIC ONLY    Collection Time: 12/03/19  8:52 PM   Result Value Ref Range    WBC 0 to 2 0 - 4 /hpf    RBC 10 to 15 0 - 5 /hpf    Epithelial cells FEW 0 - 5 /lpf    Bacteria NEGATIVE  NEG /hpf   POC G3    Collection Time: 12/03/19  9:33 PM   Result Value Ref Range    Device: VENT      FIO2 (POC) 100 %    pH (POC) 7.166 (LL) 7.35 - 7.45      pCO2 (POC) 42.7 35.0 - 45.0 MMHG    pO2 (POC) 209 (H) 80 - 100 MMHG    HCO3 (POC) 15.4 (L) 22 - 26 MMOL/L    sO2 (POC) 99 (H) 92 - 97 %    Base deficit (POC) 13 mmol/L    Mode ASSIST CONTROL      Tidal volume 450 ml    Set Rate 14 bpm    PEEP/CPAP (POC) 5 cmH2O    PIP (POC) 25      Allens test (POC) N/A      Inspiratory Time 1.00 sec    Total resp. rate 14      Site RIGHT RADIAL      Patient temp.  98.6      Specimen type (POC) ARTERIAL      Performed by Norma Cook     Volume control plus YES         EKG interpretation by ED Physician:  Sinus rhythm with occasional PAC. There is no acute ST or T wave changes  Rate 87 QRS 98   No significant changes from previous    Cardiac monitor: Normal rate with regular rhythm and no ectopy  Pulse ox: 100% on 100% FiO2 intubated    X-Ray, CT or other radiology findings or impressions:  CT HEAD WO CONT   Final Result   IMPRESSION:         1. Nonspecific deep white matter changes probably representing ischemic   demyelination unchanged. 2. No acute bleed or acute CVA. No mass effect. I communicated the above findings to Dr. Heidi Cortes via telephone at 20:58 on   12/3/2019. XR CHEST PORT    (Results Pending)   CTA HEAD NECK W CONT    (Results Pending)   Chest x-ray: ET tube at the wenceslao. Possible infiltrate at the right base. No consolidation or pneumothorax    Cta: Occlusion of proximal left vertebral artery but this is due to flow probably retrograde. There is no other acute large vessel occlusion or critical stenosis. Progress notes, Consult notes or additional Procedure notes:   After discussion with the  and that the patient became acutely unresponsive but had not appear to be choking there is some clinical concern for an acute neurologic event. We will have tele-neurology evaluate patient and call code s    Patient started having flickering of her upper lids and occasional myoclonic jerking. Patient was seen by tele-neurology who felt this is concerning for status epilepticus giving her recent cardiac arrest.  Recommended giving her Ativan and loading her with Keppra and transferring to a facility that has EEG monitoring capability. States he also reviewed CTA of the head neck and no obvious basilar thrombosis or large vessel occlusion per his review. I have updated the family took with  regarding current situation and need for transfer.     D/w  Maricarmen who is the on-call intensivist at [facility] who will accept the patient    ED Critical Care Note    System at risk for life threatening failure: Neuro, cardiac, pulmonary, renal  Associated problems: Seizure, hypotension, cardiac arrest    Critical Care services provided: Bedside management of cardiac arrest, seizure, possible status epilepticus, documentation, consultation, bedside reassessment  Excluded procedures (time not included in critical care): EKG interpretation, central line placement    Total Critical Care Time (in minutes) 128          Reevaluation of patient:   stable    Disposition:  Diagnosis:   1. Cardiac arrest ([facility] [address]    2. New onset seizure ([facility] [address]    3. Acute hypokalemia        Disposition: transfer      Follow-up Information    None           Patient's Medications   Start Taking    No medications on file   Continue Taking    ASPIRIN 81 MG CHEWABLE TABLET    Take 1 Tab by mouth daily. CALCIUM-CHOLECALCIFEROL, D3, (CALCIUM 600 + D) 600-125 MG-UNIT TAB    Take 0.5 Tabs by mouth daily. CONJUGATED ESTROGENS (PREMARIN) 0.9 MG TABLET    Take 1 Tab by mouth daily. DIAZEPAM (VALIUM) 5 MG TABLET    Take 5 mg by mouth daily. DIPHENOXYLATE-ATROPINE (LOMOTIL) 2.5-0.025 MG PER TABLET    Take  by mouth four (4) times daily as needed. LOSARTAN (COZAAR) 50 MG TABLET    Take 50 mg by mouth daily. ROSUVASTATIN (CRESTOR) 10 MG TABLET    Take 1 Tab by mouth daily. SENNA (SENOKOT) 8.6 MG TABLET    Take 1 Tab by mouth two (2) times a day. Hold for loose Bowel Movement    SYMBICORT 80-4.5 MCG/ACTUATION HFAA    Take 2 Puffs by inhalation two (2) times a day. TRIAMTERENE-HYDROCHLOROTHIAZIDE (MAXZIDE) 37.5-25 MG PER TABLET    Take 0.5 Tabs by mouth daily.    These Medications have changed    No medications on file   Stop Taking    No medications on file

## 2019-12-04 NOTE — ED NOTES
Code S Level 2 called. Patient is unresponsive post-cardiac arrest, intubated and sedated. Unable to perform NIHs per policy at this time.

## 2019-12-06 LAB
BACTERIA SPEC CULT: NORMAL
SERVICE CMNT-IMP: NORMAL

## 2019-12-09 LAB
BACTERIA SPEC CULT: NORMAL
BACTERIA SPEC CULT: NORMAL
SERVICE CMNT-IMP: NORMAL
SERVICE CMNT-IMP: NORMAL

## 2021-08-19 NOTE — PROGRESS NOTES
[facility] ED Follow Up Call    2021    Patient: Dwain Ontiveros Patient : 1979   MRN: [id number]  Reason for Admission: COVID-19  Discharge Date: 2021        Care Transitions ED Follow Up    Care Transitions Interventions  Do you have any ongoing symptoms?: No   Do you have a copy of your discharge instructions?: Yes   Do you understand what to report and when to return?: Yes   Are you following your discharge instructions?: Yes   Do you have all of your prescriptions and are they filled?: Yes   Have you scheduled your follow up appointment?: Yes   Were you discharged with any Home Care or Post Acute Services or do you currently have any active services?: No              Patient contacted regarding COVID-19 diagnosis. Discussed COVID-19 related testing which was available at this time. Test results were positive. Patient informed of results, if available? Yes    Ambulatory Care Manager contacted the patient by telephone to perform follow-up assessment. Verified name and  with patient as identifiers. Patient has following risk factors of: no known risk factors. ACM called patient for CT COVID follow up outreach. Patient reports \"feeling better\" and reports ongoing mild cough. Denies shortness of breath. States her manager told her she needed to have negative COVID test prior to return to work. States she was re-tested for COVID 2021 and was still positive. Patient states manager notified her after that she did not have to test negative to return to work due to will continue to test positive for couple months after having COVID. Patient reports has returned to work and going \"allright\". Denies needs or concerns for ACM. Symptoms reviewed with patient who verbalized the following symptoms: cough and no worsening symptoms. Due to no new or worsening symptoms encounter was not routed to provider for escalation.        Educated patient about risk for severe Patient desats to 80 /87 on room air, o2 reapplied  oob to bsc with help   at bedside  Appetite fair   patient now wants to go to snf  Discharged csncelled by Faith Gilmore also notified COVID-19 due to risk factors according to CDC guidelines. ACM reviewed discharge instructions, medical action plan and red flag symptoms with the patient who verbalized understanding. Discussed COVID vaccination status: No. Education provided on COVID-19 vaccination as appropriate. Discussed exposure protocols and quarantine with CDC Guidelines. Patient was given an opportunity to verbalize any questions and concerns and agrees to contact ACM or health care provider for questions related to their healthcare. Was patient discharged with a pulse oximeter? No Discussed and confirmed pulse oximeter discharge instructions and when to notify provider or seek emergency care. ACM provided contact information. Plan for follow up call in 10-14 days based on severity of symptoms and risk factors. Marques Coburn RN BSN  Associate Care Manager  Phone: [phone]  Email: [email or web address]

## 2021-12-08 NOTE — IP AVS SNAPSHOT
[address] Patient: Fco Lane MRN: [id number] IEL:[id number] About your hospitalization You were admitted on:  Mary Beth 15, 2018 You last received care in the:  [address] [place] [address],[address] You were discharged on:  June 19, 2018 Why you were hospitalized Your primary diagnosis was:  Chronic Pain Of Right Ankle Your diagnoses also included:  Copd (Chronic Obstructive Pulmonary Disease) (Hcc), Hypertension, Hypercholesterolemia, Ankle Arthritis Follow-up Information Follow up With Details Comments Contact Info MD Milan Espinosa [facility] [address] [facility] [phone] [id number] 
[phone] Loreto Tam MD In 2 weeks for follow up [place] [address] [address] [facility] [id number] [phone] Discharge Orders None A check juju indicates which time of day the medication should be taken. My Medications START taking these medications Instructions Each Dose to Equal  
 Morning Noon Evening Bedtime  
 enoxaparin 40 mg/0.4 mL Commonly known as:  LOVENOX Your last dose was: Your next dose is: 0.4 mL by SubCUTAneous route every twenty-four (24) hours. 40 mg HYDROmorphone 2 mg tablet Commonly known as:  DILAUDID Your last dose was: Your next dose is: Take 1-2 Tabs by mouth every four (4) hours as needed. Max Daily Amount: 24 mg.  
 2-4 mg CHANGE how you take these medications Instructions Each Dose to Equal  
 Morning Noon Evening Bedtime  
 conjugated estrogens 0.9 mg tablet Commonly known as:  PREMARIN What changed:  when to take this Your last dose was: Your next dose is: Take 1 Tab by mouth daily. 0.9 mg  
    
   
   
   
  
 rosuvastatin 10 mg tablet Commonly known as:  CRESTOR  
 What changed:  when to take this Your last dose was: Your next dose is: Take 1 Tab by mouth daily. 10 mg  
    
   
   
   
  
 triamterene-hydroCHLOROthiazide 37.5-25 mg per tablet Commonly known as:  Kvng Olivas What changed:  when to take this Your last dose was: Your next dose is: Take 0.5 Tabs by mouth daily. 0.5 Tab CONTINUE taking these medications Instructions Each Dose to Equal  
 Morning Noon Evening Bedtime  
 aspirin 81 mg chewable tablet Your last dose was: Your next dose is: Take 1 Tab by mouth daily. 81 mg CALCIUM 600 + D 600-125 mg-unit Tab Generic drug:  calcium-cholecalciferol (d3) Your last dose was: Your next dose is: Take 0.5 Tabs by mouth daily. 0.5 Tab  
    
   
   
   
  
 ergocalciferol 50,000 unit capsule Commonly known as:  ERGOCALCIFEROL Your last dose was: Your next dose is: Take 1 Cap by mouth every seven (7) days. [address] Units LOMOTIL 2.5-0.025 mg per tablet Generic drug:  diphenoxylate-atropine Your last dose was: Your next dose is: Take  by mouth four (4) times daily as needed. losartan 50 mg tablet Commonly known as:  COZAAR Your last dose was: Your next dose is: Take 50 mg by mouth daily. 50 mg  
    
   
   
   
  
 senna 8.6 mg tablet Commonly known as:  Justin Swenson Your last dose was: Your next dose is: Take 1 Tab by mouth two (2) times a day. Hold for loose Bowel Movement 1 Tab SYMBICORT 80-4.5 mcg/actuation Hfaa Generic drug:  budesonide-formoterol Your last dose was: Your next dose is: Take 2 Puffs by inhalation two (2) times a day. 2 Puff VALIUM 5 mg tablet Generic drug:  diazePAM  
   
Your last dose was: Your next dose is: Take 5 mg by mouth daily. 5 mg STOP taking these medications   
 acetaminophen 650 mg Tber Commonly known as:  TYLENOL Where to Get Your Medications Information on where to get these meds will be given to you by the nurse or doctor. ! Ask your nurse or doctor about these medications  
  enoxaparin 40 mg/0.4 mL HYDROmorphone 2 mg tablet Opioid Education Prescription Opioids: What You Need to Know: 
 
 
After general anesthesia or intravenous sedation, for 24 hours or while taking prescription Narcotics: · Limit your activities · Do not drive and operate hazardous machinery · Do not make important personal or business decisions · Do  not drink alcoholic beverages · If you have not urinated within 8 hours after discharge, please contact your surgeon on call. Report the following to your surgeon: 
· Excessive pain, swelling, redness or odor of or around the surgical area · Temperature over 100.5 · Nausea and vomiting lasting longer than 4 hours or if unable to take medications · Any signs of decreased circulation or nerve impairment to extremity: change in color, persistent  numbness, tingling, coldness or increase pain · Any questions What to do at Home: 
Recommended activity: Activity as tolerated, see surgeon's instructions. If you experience any of the symptoms above, please follow up with Dr. Cary Neff. *  Please give a list of your current medications to your Primary Care Provider.  
 
*  Please update this list whenever your medications are discontinued, doses are 
 changed, or new medications (including over-the-counter products) are added. *  Please carry medication information at all times in case of emergency situations. These are general instructions for a healthy lifestyle: No smoking/ No tobacco products/ Avoid exposure to second hand smoke Surgeon General's Warning:  Quitting smoking now greatly reduces serious risk to your health. Obesity, smoking, and sedentary lifestyle greatly increases your risk for illness A healthy diet, regular physical exercise & weight monitoring are important for maintaining a healthy lifestyle You may be retaining fluid if you have a history of heart failure or if you experience any of the following symptoms:  Weight gain of 3 pounds or more overnight or 5 pounds in a week, increased swelling in our hands or feet or shortness of breath while lying flat in bed. Please call your doctor as soon as you notice any of these symptoms; do not wait until your next office visit. Recognize signs and symptoms of STROKE: 
 
F-face looks uneven A-arms unable to move or move unevenly S-speech slurred or non-existent T-time-call 911 as soon as signs and symptoms begin-DO NOT go Back to bed or wait to see if you get better-TIME IS BRAIN. Warning Signs of HEART ATTACK Call 911 if you have these symptoms: 
? Chest discomfort. Most heart attacks involve discomfort in the center of the chest that lasts more than a few minutes, or that goes away and comes back. It can feel like uncomfortable pressure, squeezing, fullness, or pain. ? Discomfort in other areas of the upper body. Symptoms can include pain or discomfort in one or both arms, the back, neck, jaw, or stomach. ? Shortness of breath with or without chest discomfort. ? Other signs may include breaking out in a cold sweat, nausea, or lightheadedness. Don't wait more than five minutes to call [address]!  Fast action can save your life. Calling 911 is almost always the fastest way to get lifesaving treatment. Emergency Medical Services staff can begin treatment when they arrive  up to an hour sooner than if someone gets to the hospital by car. The discharge information has been reviewed with the patient. The patient verbalized understanding. Discharge medications reviewed with the patient and appropriate educational materials and side effects teaching were provided. Patient armband removed and shredded. ___________________________________________________________________________________________________________________________________ MyChart Announcement We are excited to announce that we are making your provider's discharge notes available to you in Standardized Safety. You will see these notes when they are completed and signed by the physician that discharged you from your recent hospital stay. If you have any questions or concerns about any information you see in Standardized Safety, please call the Health Information Department where you were seen or reach out to your Primary Care Provider for more information about your plan of care. Introducing [facility]! Stefania Lynch introduces Standardized Safety patient portal. Now you can access parts of your medical record, email your doctor's office, and request medication refills online. 1. In your internet browser, go to [email or web address] 2. Click on the First Time User? Click Here link in the Sign In box. You will see the New Member Sign Up page. 3. Enter your Standardized Safety Access Code exactly as it appears below. You will not need to use this code after youve completed the sign-up process. If you do not sign up before the expiration date, you must request a new code. · Standardized Safety Access Code: [email or web address] Expires: 9/13/2018  9:38 AM 
 
4.  Enter the last four digits of your Social Security Number (xxxx) and Date of Birth (mm/dd/yyyy) as indicated and click Submit. You will be taken to the next sign-up page. 5. Create a Vindit ID. This will be your [employer] login ID and cannot be changed, so think of one that is secure and easy to remember. 6. Create a Vindit password. You can change your password at any time. 7. Enter your Password Reset Question and Answer. This can be used at a later time if you forget your password. 8. Enter your e-mail address. You will receive e-mail notification when new information is available in [address] 9. Click Sign Up. You can now view and download portions of your medical record. 10. Click the Download Summary menu link to download a portable copy of your medical information. If you have questions, please visit the Frequently Asked Questions section of the [employer] website. Remember, [employer] is NOT to be used for urgent needs. For medical emergencies, dial 911. Now available from your iPhone and Android! Introducing Abelardo Dela Cruz As a Kristronny Washingtonek patient, I wanted to make you aware of our electronic visit tool called Abelardo Lenzfin. [place] 24/7 allows you to connect within minutes with a medical provider 24 hours a day, seven days a week via a mobile device or tablet or logging into a secure website from your computer. You can access Abelardo Dela Cruz from anywhere in the [place]. A virtual visit might be right for you when you have a simple condition and feel like you just dont want to get out of bed, or cant get away from work for an appointment, when your regular Krsingh Washingtonek provider is not available (evenings, weekends or holidays), or when youre out of town and need minor care. Electronic visits cost only $49 and if the Dejoniste Peek 24/7 provider determines a prescription is needed to treat your condition, one can be electronically transmitted to a nearby pharmacy*. Please take a moment to enroll today if you have not already done so. The enrollment process is free and takes just a few minutes. To enroll, please download the [employer] 24/7 kenroy to your tablet or phone, or visit [email or web address] to enroll on your computer. And, as an [address] [place] [address] patient with a Fastly account, the results of your visits will be scanned into your electronic medical record and your primary care provider will be able to view the scanned results. We urge you to continue to see your regular [employer] provider for your ongoing medical care. And while your primary care provider may not be the one available when you seek a Abelardo Sutusorlandofin virtual visit, the peace of mind you get from getting a real diagnosis real time can be priceless. For more information on [employer], view our Frequently Asked Questions (FAQs) at [email or web address]. Sincerely, 
 
Jorge Banks MD 
Chief Medical Officer [facility] Jeanine Mcdonald *:  certain medications cannot be prescribed via [employer] Unresulted tests-please follow up with your PCP on these results Procedure/Test Authorizing Provider BLOOD GAS, ARTERIAL VENITA Ross  
 CBC WITH AUTOMATED DIFF [address] DO  
 CBC WITH AUTOMATED DIFF Valerie Rico MD  
 METABOLIC PANEL, BASIC [address] DO  
 METABOLIC PANEL, BASIC Valerie Rico MD  
 METABOLIC PANEL, ANH Rico MD  
 [facility] TECHNOLOGIST [address] [place] [address] Annita Leventhal, MD  
 XR ANKLE RT  [facility] Annita Leventhal, MD  
 XR CHEST [address], [place] Providers Seen During Your Hospitalization Provider Specialty Primary office phone Valerie Rico MD Orthopedic Surgery [phone] Your Primary Care Physician (PCP) Primary Care Physician Office Phone Office Fax Cresencio Jeans [phone] You are allergic to the following Allergen Reactions Percocet (Oxycodone-Acetaminophen) Rash Information given by patient,pt refused to take ordered medication,medication discontinued  by out going nurse. Codeine Unknown (comments) Ibuprofen Other (comments) Gi distress Tiotropium Bromide Hives Vicodin (Hydrocodone-Acetaminophen) Other (comments) Neurological reaction Zocor (Simvastatin) Unknown (comments) Recent Documentation Height Weight BMI OB Status Smoking Status 1.626 m 66.2 kg 25.06 kg/m2 Hysterectomy Former Smoker Emergency Contacts Name Discharge Info Relation Home Work Mobile Andrew Syed DISCHARGE CAREGIVER [3] Spouse [3] [phone] Patient Belongings The following personal items are in your possession at time of discharge: 
  Dental Appliances: None  Visual Aid: Glasses      Home Medications: None   Jewelry: None  Clothing: Pants, Shirt, Footwear, Undergarments, Socks    Other Valuables: Eyeglasses Discharge Instructions Attachments/References ANKLE FUSION: POST-OP (ENGLISH) ENOXAPARIN (LOVENOX) (ENGLISH) HYDROMORPHONE (BY MOUTH) (ENGLISH) Patient Handouts Ankle Fusion: What to Expect at [facility] Your Recovery You had ankle fusion surgery to treat a painful, unstable ankle. When you leave the hospital, you will wear a cast or walking boot. And you will use crutches or a walker to keep your weight off your ankle. After surgery, you can expect your ankle to feel stiff and sore around the area where your doctor made the cut (incision). Your doctor will give you medicine for the pain. Your doctor may give you specific instructions on when you can do your normal activities again, such as driving and going back to work. This care sheet gives you a general idea about how long it will take for you to recover. But each person recovers at a different pace.  Follow the steps below to get better as quickly as possible. How can you care for yourself at home? Activity ? · Rest when you feel tired. ? · Avoid putting weight on your ankle until your doctor says it is okay. ? · You may shower 24 to 48 hours after surgery, if your doctor okays it. When you shower, keep your dressing and incisions dry. If you have a cast, tape a sheet of plastic to cover it so that it does not get wet. It may help to sit on a shower stool. ? · If you have a removable splint, ask your doctor if it is okay to take it off to bathe. Your doctor may want you to keep it on as much as possible. Be careful not to put the splint on too tight. ? · You will probably need to take at least 2 to 4 weeks off from work. It depends on the type of work you do and how you feel. Diet ? · You can eat your normal diet. If your stomach is upset, try bland, low-fat foods like plain rice, broiled chicken, toast, and yogurt. Medicines ? · Your doctor will tell you if and when you can restart your medicines. He or she will also give you instructions about taking any new medicines. ? · If you take blood thinners, such as warfarin (Coumadin), clopidogrel (Plavix), or aspirin, be sure to talk to your doctor. He or she will tell you if and when to start taking those medicines again. Make sure that you understand exactly what your doctor wants you to do. ? · Be safe with medicines. Read and follow all instructions on the label. ¨ If the doctor gave you a prescription medicine for pain, take it as prescribed. ¨ If you are not taking a prescription pain medicine, ask your doctor if you can take an over-the-counter medicine. ? · If your doctor prescribed antibiotics, take them as directed. Do not stop taking them just because you feel better. You need to take the full course of antibiotics. Incision care ?  · If you do not have a cast, wash the incision daily with warm, soapy water, and pat it dry. Don't use hydrogen peroxide or alcohol. They can slow healing. ? · If you have strips of tape on the incision, leave the tape on for a week or until it falls off.  
? · If you had stitches, your doctor will tell you when to come back to have them removed. Exercise ? · Ankle rehabilitation is a series of exercises you do after your surgery. This helps you improve your ankle's range of motion and strength. You will work with your doctor and physical therapist to plan this exercise program. To get the best results, you need to do the exercises correctly and as often and as long as your doctor tells you. Ice and elevation ? · Prop up the sore leg on a pillow when you ice it or anytime you sit or lie down during the next 3 days. Try to keep it above the level of your heart. This will help reduce swelling. Other instructions ? · You will need to use crutches or a walker after surgery for about 6 to 8 weeks. Your doctor will tell you when you can put weight on the ankle. It may help to use a backpack or wear clothes with a lot of pockets to carry items. Follow-up care is a key part of your treatment and safety. Be sure to make and go to all appointments, and call your doctor if you are having problems. It's also a good idea to know your test results and keep a list of the medicines you take. When should you call for help? Call 911 anytime you think you may need emergency care. For example, call if: 
? · You passed out (lost consciousness). ? · You have chest pain, are short of breath, or you cough up blood. ?Call your doctor now or seek immediate medical care if: 
? · You have pain that does not get better after you take pain medicine. ? · You have tingling, weakness, or numbness in your foot or toes. ? · Your cast or splint feels too tight. ? · Your foot is cool or pale or changes color. ? · You are sick to your stomach or cannot drink fluids. ? · You have loose stitches, or your incision comes open. ? · You have signs of a blood clot in your leg (called a deep vein thrombosis), such as: 
¨ Pain in your calf, back of the knee, thigh, or groin. ¨ Redness or swelling in your leg. ? · You have signs of infection, such as: 
¨ Increased pain, swelling, warmth, or redness. ¨ Red streaks leading from the incision. ¨ Pus draining from the incision. ¨ A fever. ? · You bleed through your bandage. ? Watch closely for any changes in your health, and be sure to contact your doctor if: 
? · You have a problem with your cast or splint. ? · You are not getting better as expected. Where can you learn more? Go to http://neha-carmela.info/. Enter P415 in the search box to learn more about \"Ankle Fusion: What to Expect at Home. \" Current as of: March 21, 2017 Content Version: 11.4 © 0356-1204 [employer]. Care instructions adapted under license by [employer] (which disclaims liability or warranty for this information). If you have questions about a medical condition or this instruction, always ask your healthcare professional. [place] [address] any warranty or liability for your use of this information. Enoxaparin (Lovenox): Care Instructions Your Care Instructions Enoxaparin (Lovenox) is an anticoagulant medicine. It is one of a class of anticoagulants called low molecular weight heparin. Many people call these medicines blood thinners. They don't actually thin the blood, but they increase the time it takes a blood clot to form. This reduces the chance of a blood clot in the leg veins (deep vein thrombosis) or in the lungs (pulmonary embolism). Enoxaparin is a shot (injection). You or someone caring for you will inject it once or twice a day. Most people need shots for 5 to 10 days, but in some cases it can be longer.  Your doctor will tell you how long you need to have the shots. Enoxaparin is used to: · Treat deep vein thrombosis (DVT), which is a blood clot in the legs, pelvis, or arms. · Reduce the chance of getting blood clots after certain surgeries. For example, you may take enoxaparin after knee or hip replacement surgery. · Reduce the chance of getting blood clots in people who are likely to get them and who are not active for a long period of time. For example, you may need enoxaparin if you need to stay in bed for a long time because of a health problem. · Reduce the chance of blood clots when another blood thinner is stopped for a short time. For example, if you take warfarin and need surgery, your doctor may ask you to stop taking warfarin for a short time before the surgery. You will take enoxaparin to help prevent blood clots before the surgery. After the surgery, your doctor will tell you when it is safe to start taking warfarin again. This is called bridge therapy. Follow-up care is a key part of your treatment and safety. Be sure to make and go to all appointments, and call your doctor if you are having problems. It's also a good idea to know your test results and keep a list of the medicines you take. How can you care for yourself at home? How to inject enoxaparin You will get a prescription for prefilled syringes. Inject the medicine at the same time every day unless your doctor gives you other instructions. 1. Wash and dry your hands. 2. Sit or lie in a position that lets you see your belly. 3. Clean the injection site with an alcohol pad or swab, and let it dry. Choose a site on the right or left side of your belly, at least 2 inches from your belly button. Change the site each time you inject the medicine. 4. Remove the needle cap by pulling it straight off. Don't twist it. 5. Hold the syringe like a pencil in one hand. With the other hand, pinch an area of the injection site skin. You should have a \"fold\" in the skin. 6. Insert the entire needle straight down into the fold of skin. Don't insert the needle at an angle. 7. Press the plunger with your thumb until the syringe is empty. 8. Pull the needle straight out and let go of the skin. 9. Point the needle away from you and press down on the plunger. The needle will be covered. Take precautions · Don't rub the injection site. This could cause bruising. · Don't push air bubbles out of the syringe unless your doctor tells you to. Each syringe comes with air bubbles. · Don't stop taking enoxaparin without talking to your doctor. · If you are taking a blood thinner, be sure you get instructions about how to take your medicine safely. Blood thinners can cause serious bleeding problems. · Talk to your doctor before you take any prescription medicines, over-the-counter medicines, antibiotics, vitamins, or herbal products. · Don't take the following medicines unless your doctor says it's okay: ¨ Aspirin, products like aspirin (salicylates), or products that contain aspirin ¨ Nonsteroidal anti-inflammatory drugs (NSAIDs), such as ibuprofen (Advil, Motrin) and naproxen (Aleve) · Store enoxaparin at room temperature. Don't put it in the refrigerator or freezer. When should you call for help? Call 911 anytime you think you may need emergency care. For example, call if: 
? · You passed out (lost consciousness). ? · You have signs of severe bleeding, such as: ¨ A severe headache that is different from past headaches. ¨ Vomiting blood or what looks like coffee grounds. ¨ Passing maroon or very bloody stools. ?Call your doctor now or seek immediate medical care if: 
? · You have unexpected bleeding, including: ¨ Blood in stools or black stools that look like tar. ¨ Blood in your urine. ¨ Bruises or blood spots under the skin. ? · You feel dizzy or lightheaded. ? Watch closely for changes in your health, and be sure to contact your doctor if: ? · You do not get better as expected. Where can you learn more? Go to http://neha-carmela.info/. Enter P266 in the search box to learn more about \"Enoxaparin (Lovenox): Care Instructions. \" Current as of: September 21, 2016 Content Version: 11.4 © [id number] [employer]. Care instructions adapted under license by [employer] (which disclaims liability or warranty for this information). If you have questions about a medical condition or this instruction, always ask your healthcare professional. [address] any warranty or liability for your use of this information. Hydromorphone (By mouth) Hydromorphone ([id number]) Treats moderate to severe pain. This medicine is a narcotic pain reliever. Brand Name(s): Dilaudid, Exalgo There may be other brand names for this medicine. When This Medicine Should Not Be Used: This medicine is not right for everyone. Do not use it if you had an allergic reaction to hydromorphone or sulfites, or if you have severe lung or breathing problems, or stomach or bowel blockage (including paralytic ileus). How to Use This Medicine:  
Long Acting Capsule, Liquid, Tablet, Long Acting Tablet · Take your medicine as directed. Your dose may need to be changed several times to find what works best for you. An overdose can be dangerous. Follow directions carefully so you do not get too much medicine at one time. · Oral liquid: Measure the oral liquid medicine with a marked measuring spoon, oral syringe, or medicine cup. If you get any of the oral liquid on your skin, rinse it with cool water right away. · Swallow the extended-release capsule whole. Do not crush, break, or chew it. · Swallow the extended-release tablet whole. Do not crush, break, or chew it. · If you take the extended-release tablet, part of the tablet may pass into your stools. This is normal and is nothing to worry about. · Hydromorphone extended-release capsules or tablets work differently than regular hydromorphone tablets, even at the same dose. Do not switch from one form to another unless your doctor tells you to. · This medicine should come with a Medication Guide. Ask your pharmacist for a copy if you do not have one. · Missed dose:  
¨ Extended-release capsules or tablets: If you miss a dose, skip the missed dose and take your next dose at the usual time the next day. Do not double doses. ¨ Oral liquid: Take a dose as soon as you remember. If it is almost time for your next dose, wait until then and take a regular dose. Do not take extra medicine to make up for a missed dose. · Store the medicine in a closed container at room temperature, away from heat, moisture, and direct light. Store the medicine in a safe and secure place. Do not throw unused medicine in the trash. Ask your pharmacist about the best way to dispose of medicine you do not use. Drugs and Foods to Avoid: Ask your doctor or pharmacist before using any other medicine, including over-the-counter medicines, vitamins, and herbal products. · Do not use this medicine if you are using or have used an MAO inhibitor within the past 14 days. · Some medicines can affect how hydromorphone works. Tell your doctor if you are using any of the following: ¨ Blood pressure medicine ¨ Diuretic (water pill) ¨ Medicine to treat depression ¨ Phenothiazine medicine · Do not drink alcohol while you are using this medicine. · Tell your doctor if you use anything else that makes you sleepy. Some examples are allergy medicine, narcotic pain medicine, and alcohol. Tell your doctor if you are also using buprenorphine, butorphanol, nalbuphine, pentazocine, or a muscle relaxer. Warnings While Using This Medicine: · Tell your doctor if you are pregnant or breastfeeding, or if you have kidney disease, liver disease, lung disease or breathing problems (such as asthma or COPD), an underactive thyroid, adrenal problems, cystic fibrosis, pancreas problems, gallbladder problems, an enlarged prostate, trouble urinating, or stomach or bowel problems. Tell your doctor if you have a history of head injury, brain tumor, seizures, depression, or alcohol or drug abuse. · This medicine may cause the following problems: 
¨ High risk of overdose, which can lead to death ¨ Respiratory depression (serious breathing problem that can be life-threatening) ¨ Serotonin syndrome, when used with certain medicines · This medicine can be habit-forming. Do not use more than your prescribed dose. Call your doctor if you think your medicine is not working. · Do not stop using this medicine suddenly. Your doctor will need to slowly decrease your dose before you stop it completely. · This medicine may make you dizzy, drowsy, or lightheaded. Do not drive or do anything else that could be dangerous until you know how this medicine affects you. Sit or lie down if you feel dizzy. Stand up carefully. · This medicine could cause infertility. Talk with your doctor before using this medicine if you plan to have children. · This medicine may cause constipation, especially with long-term use. Ask your doctor if you should use a laxative to prevent and treat constipation. · Keep all medicine out of the reach of children. Never share your medicine with anyone. Possible Side Effects While Using This Medicine:  
Call your doctor right away if you notice any of these side effects: · Allergic reaction: Itching or hives, swelling in your face or hands, swelling or tingling in your mouth or throat, chest tightness, trouble breathing · Anxiety, restlessness, fast heartbeat, fever, sweating, muscle spasms, twitching, nausea, vomiting, diarrhea, seeing or hearing things that are not there · Blue lips, fingernails, or skin · Extreme dizziness or weakness, shallow breathing, slow or uneven heartbeat, sweating, cold or clammy skin, seizures · Severe confusion, lightheadedness, dizziness, fainting · Severe constipation, stomach pain, or vomiting · Trouble breathing or slow breathing If you notice these less serious side effects, talk with your doctor: · Mild constipation, nausea, or vomiting · Mild sleepiness or tiredness If you notice other side effects that you think are caused by this medicine, tell your doctor. Call your doctor for medical advice about side effects. You may report side effects to FDA at 7-843-FDA-2323 © 2017 [facility] Information is for End User's use only and may not be sold, redistributed or otherwise used for commercial purposes. The above information is an  only. It is not intended as medical advice for individual conditions or treatments. Talk to your doctor, nurse or pharmacist before following any medical regimen to see if it is safe and effective for you. Please provide this summary of care documentation to your next provider. Signatures-by signing, you are acknowledging that this After Visit Summary has been reviewed with you and you have received a copy. Patient Signature:  ____________________________________________________________ Date:  ____________________________________________________________  
  
[facility] Provider Signature:  ____________________________________________________________ Date:  ____________________________________________________________ Area H Indication Text: Tumors in this location are included in Area H (eyelids, eyebrows, nose, lips, chin, ear, pre-auricular, post-auricular, temple, genitalia, hands, feet, ankles and areola).  Tissue conservation is critical in these anatomic locations.

## (undated) DEVICE — STERILE POLYISOPRENE POWDER-FREE SURGICAL GLOVES: Brand: PROTEXIS

## (undated) DEVICE — DISPOSABLE TOURNIQUET CUFF SINGLE BLADDER, DUAL PORT AND QUICK CONNECT CONNECTOR: Brand: COLOR CUFF

## (undated) DEVICE — OCCLUSIVE GAUZE STRIP,3% BISMUTH TRIBROMOPHENATE IN PETROLATUM BLEND: Brand: XEROFORM

## (undated) DEVICE — KENDALL SCD EXPRESS SLEEVES, KNEE LENGTH, MEDIUM: Brand: KENDALL SCD

## (undated) DEVICE — BIT DRL DIA9MM ENTRY FOR ANK COMPR NAILING SYS

## (undated) DEVICE — REM POLYHESIVE ADULT PATIENT RETURN ELECTRODE: Brand: VALLEYLAB

## (undated) DEVICE — ROLL COT W11.5INXL11FT 1LB HIGHLY ABSRB SURG GRD

## (undated) DEVICE — COVER,C-ARM,41X74: Brand: MEDLINE

## (undated) DEVICE — KIT PROC EXTRM HND FT CUST LF --

## (undated) DEVICE — U-DRAPE: Brand: CONVERTORS

## (undated) DEVICE — SOLUTION IV 1000ML 0.9% SOD CHL

## (undated) DEVICE — NEEDLE HYPO 25GA L1.5IN BLU POLYPR HUB S STL REG BVL STR

## (undated) DEVICE — STOCKINETTE IMPERV REG 9X48IN --

## (undated) DEVICE — SUT ETHLN 3-0 18IN PS1 BLK --

## (undated) DEVICE — REAMER SURG DIA13MM ENTRY FOR ANK COMPR NAILING SYS

## (undated) DEVICE — NDL PRT INJ NSAF BLNT 18GX1.5 --

## (undated) DEVICE — GUIDEWIRE ORTH L800MM DIA3MM BALL TIP

## (undated) DEVICE — PHYSIO-STIM BONE GROWTH STIMULATOR: Brand: PHYSIO-STIM

## (undated) DEVICE — X-RAY SPONGES,12 PLY: Brand: DERMACEA

## (undated) DEVICE — HEAVY DRAINAGE PACK: Brand: CURITY

## (undated) DEVICE — TAPE CST XF SET SPEC 4INX5YD --

## (undated) DEVICE — BIT DRL DIA4.3MM CALIB ANK COMPR NAILING SYS

## (undated) DEVICE — Device

## (undated) DEVICE — BIT DRL DIA7MM ENTRY FOR ANK COMPR NAILING SYS

## (undated) DEVICE — COTTON UNDERCAST PADDING,REGULAR FINISH: Brand: WEBRIL

## (undated) DEVICE — SYR 10ML CTRL LR LCK NSAF LF --

## (undated) DEVICE — PREP SKN CHLRAPRP 26ML TNT -- CONVERT TO ITEM 373320